# Patient Record
Sex: FEMALE | Race: WHITE | URBAN - METROPOLITAN AREA
[De-identification: names, ages, dates, MRNs, and addresses within clinical notes are randomized per-mention and may not be internally consistent; named-entity substitution may affect disease eponyms.]

---

## 2024-02-06 ENCOUNTER — ULTRASOUND ENCOUNTER (OUTPATIENT)
Facility: CLINIC | Age: 29
End: 2024-02-06
Payer: COMMERCIAL

## 2024-02-06 ENCOUNTER — INITIAL PRENATAL (OUTPATIENT)
Facility: CLINIC | Age: 29
End: 2024-02-06
Payer: COMMERCIAL

## 2024-02-06 VITALS
WEIGHT: 253.63 LBS | DIASTOLIC BLOOD PRESSURE: 62 MMHG | SYSTOLIC BLOOD PRESSURE: 112 MMHG | HEART RATE: 89 BPM | HEIGHT: 64 IN | BODY MASS INDEX: 43.3 KG/M2

## 2024-02-06 DIAGNOSIS — O99.210 SEVERE OBESITY DUE TO EXCESS CALORIES AFFECTING PREGNANCY, ANTEPARTUM (HCC): ICD-10-CM

## 2024-02-06 DIAGNOSIS — Z3A.01 LESS THAN 8 WEEKS GESTATION OF PREGNANCY (HCC): ICD-10-CM

## 2024-02-06 DIAGNOSIS — E66.01 SEVERE OBESITY DUE TO EXCESS CALORIES AFFECTING PREGNANCY, ANTEPARTUM (HCC): ICD-10-CM

## 2024-02-06 DIAGNOSIS — Z34.80 ENCOUNTER FOR SUPERVISION OF OTHER NORMAL PREGNANCY, UNSPECIFIED TRIMESTER (HCC): Primary | ICD-10-CM

## 2024-02-06 LAB
APPEARANCE: CLEAR
BILIRUBIN: NEGATIVE
CREAT UR-SCNC: 153 MG/DL
GLUCOSE (URINE DIPSTICK): NEGATIVE MG/DL
KETONES (URINE DIPSTICK): NEGATIVE MG/DL
LEUKOCYTES: NEGATIVE
MULTISTIX LOT#: NORMAL NUMERIC
NITRITE, URINE: NEGATIVE
OCCULT BLOOD: NEGATIVE
PH, URINE: 5.5 (ref 4.5–8)
PROT UR-MCNC: 6.4 MG/DL
PROT/CREAT UR-RTO: 0.04
PROTEIN (URINE DIPSTICK): NEGATIVE MG/DL
SPECIFIC GRAVITY: 1.03 (ref 1–1.03)
URINE-COLOR: YELLOW
UROBILINOGEN,SEMI-QN: 0.2 MG/DL (ref 0–1.9)

## 2024-02-06 PROCEDURE — 3078F DIAST BP <80 MM HG: CPT | Performed by: OBSTETRICS & GYNECOLOGY

## 2024-02-06 PROCEDURE — 3074F SYST BP LT 130 MM HG: CPT | Performed by: OBSTETRICS & GYNECOLOGY

## 2024-02-06 PROCEDURE — 82570 ASSAY OF URINE CREATININE: CPT | Performed by: OBSTETRICS & GYNECOLOGY

## 2024-02-06 PROCEDURE — 84156 ASSAY OF PROTEIN URINE: CPT | Performed by: OBSTETRICS & GYNECOLOGY

## 2024-02-06 PROCEDURE — 76801 OB US < 14 WKS SINGLE FETUS: CPT | Performed by: OBSTETRICS & GYNECOLOGY

## 2024-02-06 PROCEDURE — 3008F BODY MASS INDEX DOCD: CPT | Performed by: OBSTETRICS & GYNECOLOGY

## 2024-02-06 PROCEDURE — 87086 URINE CULTURE/COLONY COUNT: CPT | Performed by: OBSTETRICS & GYNECOLOGY

## 2024-02-06 PROCEDURE — 81002 URINALYSIS NONAUTO W/O SCOPE: CPT | Performed by: OBSTETRICS & GYNECOLOGY

## 2024-02-06 RX ORDER — PROGESTERONE 200 MG/1
200 CAPSULE ORAL NIGHTLY
COMMUNITY
Start: 2024-01-19 | End: 2024-02-15

## 2024-02-06 RX ORDER — CHOLECALCIFEROL (VITAMIN D3) 25 MCG
1 TABLET,CHEWABLE ORAL DAILY
COMMUNITY

## 2024-02-06 NOTE — PROGRESS NOTES
New OB    Patient had spotting couple weeks ago and was started on progesterone 200 BID oral, no bleeding for the past week  - denies h/o HSV, blood transfusion, hepatitis  - had carrier screen with last pregnancy done - negative per patient  - EDC by 7w1d , not c/w LMP,menses every 35-37 days.  - patient had pap smear , would like to postpone pap until after delivery  - NIPS next visit    Poor OB history    - fell at 24 weeks, started bleeding ( abruptio?) delivered at 26 weeks, baby  5d   SAB 6-7 weeks   had cerclage 16 weeks in [facility]. Patient said her cervical length was 3.5 at the time but suspected ?funneling, patient had PPROM several days later, developed chorio, delivered 18-19 weeks      Obesity  - discussed limit weight gain, diet modifications  - 1GTT, CMP, TSH, p/c ratio  - mentioned ASA to start after next visit  - L2U, 32 weeks growth, NST 36 wks      2. Incompetent cervix?  - cervical length   - cervical length 12-13 weeks

## 2024-02-07 RX ORDER — PROGESTERONE 200 MG/1
200 CAPSULE ORAL NIGHTLY
Refills: 0 | OUTPATIENT
Start: 2024-02-07

## 2024-02-08 ENCOUNTER — LAB ENCOUNTER (OUTPATIENT)
Dept: LAB | Facility: HOSPITAL | Age: 29
End: 2024-02-08
Attending: OBSTETRICS & GYNECOLOGY
Payer: COMMERCIAL

## 2024-02-08 DIAGNOSIS — Z34.80 ENCOUNTER FOR SUPERVISION OF OTHER NORMAL PREGNANCY, UNSPECIFIED TRIMESTER: ICD-10-CM

## 2024-02-08 DIAGNOSIS — E66.01 SEVERE OBESITY DUE TO EXCESS CALORIES AFFECTING PREGNANCY, ANTEPARTUM (HCC): ICD-10-CM

## 2024-02-08 DIAGNOSIS — O99.210 SEVERE OBESITY DUE TO EXCESS CALORIES AFFECTING PREGNANCY, ANTEPARTUM (HCC): ICD-10-CM

## 2024-02-08 LAB
ALBUMIN SERPL-MCNC: 3.6 G/DL (ref 3.4–5)
ALBUMIN/GLOB SERPL: 1 {RATIO} (ref 1–2)
ALP LIVER SERPL-CCNC: 75 U/L
ALT SERPL-CCNC: 23 U/L
ANION GAP SERPL CALC-SCNC: 5 MMOL/L (ref 0–18)
ANTIBODY SCREEN: NEGATIVE
AST SERPL-CCNC: 12 U/L (ref 15–37)
BASOPHILS # BLD AUTO: 0.07 X10(3) UL (ref 0–0.2)
BASOPHILS NFR BLD AUTO: 0.6 %
BILIRUB SERPL-MCNC: 0.2 MG/DL (ref 0.1–2)
BUN BLD-MCNC: 8 MG/DL (ref 9–23)
CALCIUM BLD-MCNC: 9 MG/DL (ref 8.5–10.1)
CHLORIDE SERPL-SCNC: 107 MMOL/L (ref 98–112)
CO2 SERPL-SCNC: 25 MMOL/L (ref 21–32)
CREAT BLD-MCNC: 0.93 MG/DL
EGFRCR SERPLBLD CKD-EPI 2021: 86 ML/MIN/1.73M2 (ref 60–?)
EOSINOPHIL # BLD AUTO: 0.06 X10(3) UL (ref 0–0.7)
EOSINOPHIL NFR BLD AUTO: 0.5 %
ERYTHROCYTE [DISTWIDTH] IN BLOOD BY AUTOMATED COUNT: 12.4 %
FASTING STATUS PATIENT QL REPORTED: NO
GLOBULIN PLAS-MCNC: 3.7 G/DL (ref 2.8–4.4)
GLUCOSE 1H P GLC SERPL-MCNC: 112 MG/DL
GLUCOSE BLD-MCNC: 110 MG/DL (ref 70–99)
HBV SURFACE AG SER-ACNC: <0.1 [IU]/L
HBV SURFACE AG SERPL QL IA: NONREACTIVE
HCT VFR BLD AUTO: 43.9 %
HCV AB SERPL QL IA: NONREACTIVE
HGB BLD-MCNC: 14.9 G/DL
IMM GRANULOCYTES # BLD AUTO: 0.09 X10(3) UL (ref 0–1)
IMM GRANULOCYTES NFR BLD: 0.7 %
LYMPHOCYTES # BLD AUTO: 2.63 X10(3) UL (ref 1–4)
LYMPHOCYTES NFR BLD AUTO: 21.8 %
MCH RBC QN AUTO: 31 PG (ref 26–34)
MCHC RBC AUTO-ENTMCNC: 33.9 G/DL (ref 31–37)
MCV RBC AUTO: 91.3 FL
MONOCYTES # BLD AUTO: 0.68 X10(3) UL (ref 0.1–1)
MONOCYTES NFR BLD AUTO: 5.6 %
NEUTROPHILS # BLD AUTO: 8.51 X10 (3) UL (ref 1.5–7.7)
NEUTROPHILS # BLD AUTO: 8.51 X10(3) UL (ref 1.5–7.7)
NEUTROPHILS NFR BLD AUTO: 70.8 %
OSMOLALITY SERPL CALC.SUM OF ELEC: 283 MOSM/KG (ref 275–295)
PLATELET # BLD AUTO: 302 10(3)UL (ref 150–450)
POTASSIUM SERPL-SCNC: 3.6 MMOL/L (ref 3.5–5.1)
PROT SERPL-MCNC: 7.3 G/DL (ref 6.4–8.2)
RBC # BLD AUTO: 4.81 X10(6)UL
RH BLOOD TYPE: POSITIVE
RUBV IGG SER QL: POSITIVE
RUBV IGG SER-ACNC: 80.7 IU/ML (ref 10–?)
SODIUM SERPL-SCNC: 137 MMOL/L (ref 136–145)
T PALLIDUM AB SER QL IA: NONREACTIVE
TSI SER-ACNC: 1.47 MIU/ML (ref 0.36–3.74)
WBC # BLD AUTO: 12 X10(3) UL (ref 4–11)

## 2024-02-08 PROCEDURE — 82950 GLUCOSE TEST: CPT

## 2024-02-08 PROCEDURE — 86850 RBC ANTIBODY SCREEN: CPT

## 2024-02-08 PROCEDURE — 80053 COMPREHEN METABOLIC PANEL: CPT

## 2024-02-08 PROCEDURE — 86900 BLOOD TYPING SEROLOGIC ABO: CPT

## 2024-02-08 PROCEDURE — 86803 HEPATITIS C AB TEST: CPT

## 2024-02-08 PROCEDURE — 85025 COMPLETE CBC W/AUTO DIFF WBC: CPT

## 2024-02-08 PROCEDURE — 86762 RUBELLA ANTIBODY: CPT

## 2024-02-08 PROCEDURE — 87389 HIV-1 AG W/HIV-1&-2 AB AG IA: CPT

## 2024-02-08 PROCEDURE — 84443 ASSAY THYROID STIM HORMONE: CPT

## 2024-02-08 PROCEDURE — 86780 TREPONEMA PALLIDUM: CPT

## 2024-02-08 PROCEDURE — 87340 HEPATITIS B SURFACE AG IA: CPT

## 2024-02-08 PROCEDURE — 36415 COLL VENOUS BLD VENIPUNCTURE: CPT

## 2024-02-08 PROCEDURE — 86901 BLOOD TYPING SEROLOGIC RH(D): CPT

## 2024-02-12 RX ORDER — PROGESTERONE 200 MG/1
200 CAPSULE ORAL NIGHTLY
Refills: 0 | OUTPATIENT
Start: 2024-02-12

## 2024-02-13 NOTE — TELEPHONE ENCOUNTER
Patient has called the office requesting refill, refill has been denied. She has appointment coming up March 8.

## 2024-02-15 RX ORDER — PROGESTERONE 200 MG/1
200 CAPSULE ORAL NIGHTLY
Qty: 30 CAPSULE | Refills: 0 | Status: SHIPPED | OUTPATIENT
Start: 2024-02-15

## 2024-02-16 ENCOUNTER — TELEPHONE (OUTPATIENT)
Facility: CLINIC | Age: 29
End: 2024-02-16

## 2024-02-16 NOTE — TELEPHONE ENCOUNTER
Singapore[place]  #[id number]    Spoke with pt. 8 weeks 4 days had FOB- 2/6/24. C/O small amount dark brown discharge. Nothing on her pad or her in her underwear. Noticed on toilet paper when wipes. Denies cramping. Has some mild low back pain. Denies any urinary symptoms. Pt is requesting vaginal progesterone. I explained vaginal progesterone is not need at this time. Last progesterone level was 16.00. Offered an appointment tomorrow. Would prefer not to come in. She lives 1 hour away. Advised to continue to monitor. To call with any increased discharge, bright red bleeding, or abdominal pain. Will route to on call provider and call if any other recommendations. Verbalized understanding.

## 2024-03-08 ENCOUNTER — ROUTINE PRENATAL (OUTPATIENT)
Facility: CLINIC | Age: 29
End: 2024-03-08
Payer: COMMERCIAL

## 2024-03-08 ENCOUNTER — TELEPHONE (OUTPATIENT)
Facility: CLINIC | Age: 29
End: 2024-03-08

## 2024-03-08 ENCOUNTER — ULTRASOUND ENCOUNTER (OUTPATIENT)
Facility: CLINIC | Age: 29
End: 2024-03-08
Payer: COMMERCIAL

## 2024-03-08 VITALS
SYSTOLIC BLOOD PRESSURE: 104 MMHG | HEIGHT: 64 IN | BODY MASS INDEX: 44.22 KG/M2 | WEIGHT: 259 LBS | DIASTOLIC BLOOD PRESSURE: 62 MMHG | HEART RATE: 120 BPM

## 2024-03-08 DIAGNOSIS — Z34.81 PRENATAL CARE, SUBSEQUENT PREGNANCY IN FIRST TRIMESTER (HCC): Primary | ICD-10-CM

## 2024-03-08 DIAGNOSIS — O26.851 SPOTTING AFFECTING PREGNANCY IN FIRST TRIMESTER (HCC): ICD-10-CM

## 2024-03-08 DIAGNOSIS — Z3A.11 11 WEEKS GESTATION OF PREGNANCY (HCC): ICD-10-CM

## 2024-03-08 PROCEDURE — 3008F BODY MASS INDEX DOCD: CPT | Performed by: OBSTETRICS & GYNECOLOGY

## 2024-03-08 PROCEDURE — 76817 TRANSVAGINAL US OBSTETRIC: CPT | Performed by: OBSTETRICS & GYNECOLOGY

## 2024-03-08 PROCEDURE — 3074F SYST BP LT 130 MM HG: CPT | Performed by: OBSTETRICS & GYNECOLOGY

## 2024-03-08 PROCEDURE — 3078F DIAST BP <80 MM HG: CPT | Performed by: OBSTETRICS & GYNECOLOGY

## 2024-03-08 PROCEDURE — 81514 NFCT DS BV&VAGINITIS DNA ALG: CPT | Performed by: OBSTETRICS & GYNECOLOGY

## 2024-03-08 NOTE — PROGRESS NOTES
Patient noticed one episode of pink discharge 2 days ago    SVE: no bleeding, vag culture done    - had carrier screen with last pregnancy done - negative per patient  - EDC by 7w1d , not c/w LMP,menses every 35-37 days.    - NIPS today     Poor OB history    - fell at 24 weeks, started bleeding ( abruptio?) delivered at 26 weeks, baby  5d   SAB 6-7 weeks   had cerclage 16 weeks in [facility]. Patient said her cervical length was 3.5 at the time but suspected ?funneling, patient had PPROM several days later, developed chorio, delivered 18-19 weeks        Obesity  - discussed limit weight gain, diet modifications  - 1GTT, CMP, TSH, p/c ratio normal  -  ASA 1.5 daily will start  - L2U, 32 weeks growth, NST 36 wks        2. Incompetent cervix?  - cervical length   - cervical length 11w4d - 3.5 cm vaginal

## 2024-03-08 NOTE — TELEPHONE ENCOUNTER
11   Ivey pregnancy    Juliette NIPS lab draw per Dr. Hodge     Tubes name/ labeled verified with patient  Specimen placed in  to order  Discussed to call office in 1 weeks for results, gender information will be sent in her email.   Patient verbalized understanding, agreed to and intend to comply with plan of care.

## 2024-03-09 LAB
BV BACTERIA DNA VAG QL NAA+PROBE: NEGATIVE
C GLABRATA DNA VAG QL NAA+PROBE: NEGATIVE
C KRUSEI DNA VAG QL NAA+PROBE: NEGATIVE
CANDIDA DNA VAG QL NAA+PROBE: NEGATIVE
T VAGINALIS DNA VAG QL NAA+PROBE: NEGATIVE

## 2024-03-17 ENCOUNTER — TELEPHONE (OUTPATIENT)
Facility: CLINIC | Age: 29
End: 2024-03-17

## 2024-03-19 ENCOUNTER — LAB ENCOUNTER (OUTPATIENT)
Dept: LAB | Facility: HOSPITAL | Age: 29
End: 2024-03-19
Attending: OBSTETRICS & GYNECOLOGY
Payer: COMMERCIAL

## 2024-03-19 ENCOUNTER — ULTRASOUND ENCOUNTER (OUTPATIENT)
Facility: CLINIC | Age: 29
End: 2024-03-19
Payer: COMMERCIAL

## 2024-03-19 ENCOUNTER — OFFICE VISIT (OUTPATIENT)
Facility: CLINIC | Age: 29
End: 2024-03-19
Payer: COMMERCIAL

## 2024-03-19 ENCOUNTER — TELEPHONE (OUTPATIENT)
Facility: CLINIC | Age: 29
End: 2024-03-19

## 2024-03-19 VITALS
DIASTOLIC BLOOD PRESSURE: 64 MMHG | HEIGHT: 64 IN | WEIGHT: 258.19 LBS | BODY MASS INDEX: 44.08 KG/M2 | HEART RATE: 101 BPM | SYSTOLIC BLOOD PRESSURE: 110 MMHG

## 2024-03-19 DIAGNOSIS — O46.90 VAGINAL BLEEDING IN PREGNANCY (HCC): ICD-10-CM

## 2024-03-19 DIAGNOSIS — O26.20 RECURRENT PREGNANCY LOSS, ANTEPARTUM CONDITION OR COMPLICATION (HCC): ICD-10-CM

## 2024-03-19 DIAGNOSIS — Z3A.13 13 WEEKS GESTATION OF PREGNANCY (HCC): ICD-10-CM

## 2024-03-19 DIAGNOSIS — Z34.81 PRENATAL CARE, SUBSEQUENT PREGNANCY IN FIRST TRIMESTER (HCC): ICD-10-CM

## 2024-03-19 DIAGNOSIS — O26.20 RECURRENT PREGNANCY LOSS, ANTEPARTUM CONDITION OR COMPLICATION (HCC): Primary | ICD-10-CM

## 2024-03-19 PROBLEM — O99.210 OBESITY AFFECTING PREGNANCY, ANTEPARTUM (HCC): Status: ACTIVE | Noted: 2024-03-19

## 2024-03-19 PROCEDURE — 3008F BODY MASS INDEX DOCD: CPT | Performed by: OBSTETRICS & GYNECOLOGY

## 2024-03-19 PROCEDURE — 3078F DIAST BP <80 MM HG: CPT | Performed by: OBSTETRICS & GYNECOLOGY

## 2024-03-19 PROCEDURE — 76817 TRANSVAGINAL US OBSTETRIC: CPT | Performed by: OBSTETRICS & GYNECOLOGY

## 2024-03-19 PROCEDURE — 86147 CARDIOLIPIN ANTIBODY EA IG: CPT

## 2024-03-19 PROCEDURE — 99214 OFFICE O/P EST MOD 30 MIN: CPT | Performed by: OBSTETRICS & GYNECOLOGY

## 2024-03-19 PROCEDURE — 86146 BETA-2 GLYCOPROTEIN ANTIBODY: CPT

## 2024-03-19 PROCEDURE — 3074F SYST BP LT 130 MM HG: CPT | Performed by: OBSTETRICS & GYNECOLOGY

## 2024-03-19 PROCEDURE — 85610 PROTHROMBIN TIME: CPT

## 2024-03-19 PROCEDURE — 36415 COLL VENOUS BLD VENIPUNCTURE: CPT

## 2024-03-19 RX ORDER — PROGESTERONE 200 MG/1
200 CAPSULE ORAL NIGHTLY
Qty: 30 CAPSULE | Refills: 0 | Status: SHIPPED | OUTPATIENT
Start: 2024-03-19

## 2024-03-19 NOTE — PROGRESS NOTES
Maral Young is a 28 year old female  Patient's last menstrual period was 12/10/2023 (exact date).   Chief Complaint   Patient presents with    Prenatal Care     13 weeks   Last night had some heavy bleeding (clots)   Discuss US from today    .  Patient is 13w1d pregnant started heavy bleeding with clots last night, she felt like she lost a lot of blood, showed picture with clots. The bleeding eventually slowed down, spotting today  OBSTETRICS HISTORY:  OB History    Para Term  AB Living   4 2   2 1     SAB IAB Ectopic Multiple Live Births   1              # Outcome Date GA Lbr Wilbert/2nd Weight Sex Delivery Anes PTL Lv   4 Current            3  22 19w0d    Vag-Forceps   FD      Complications: Chorioamnionitis, Other - see comments   2 SAB  6w0d          1  14 26w0d   M Vag-Spont   FD      Complications: Other - see comments       GYNE HISTORY:  Periods  -pregnant     History   Sexual Activity    Sexual activity: Not on file                 MEDICAL HISTORY:  No past medical history on file.    SURGICAL HISTORY:  Past Surgical History:   Procedure Laterality Date    Appendectomy         SOCIAL HISTORY:  Social History     Socioeconomic History    Marital status:      Spouse name: Not on file    Number of children: Not on file    Years of education: Not on file    Highest education level: Not on file   Occupational History    Not on file   Tobacco Use    Smoking status: Never    Smokeless tobacco: Never   Substance and Sexual Activity    Alcohol use: Not Currently    Drug use: Never    Sexual activity: Not on file   Other Topics Concern    Not on file   Social History Narrative    Not on file     Social Determinants of Health     Financial Resource Strain: Not on file   Food Insecurity: Not on file   Transportation Needs: Not on file   Stress: Not on file   Housing Stability: Not on file       FAMILY HISTORY:  No family history on  file.    MEDICATIONS:    Current Outpatient Medications:     ASPIRIN OR, Take by mouth., Disp: , Rfl:     progesterone 200 MG Oral Cap, Take 1 capsule (200 mg total) by mouth nightly. TAKE AT BEDTIME, Disp: 30 capsule, Rfl: 0    prenatal vitamin with DHA 27-0.8-228 MG Oral Cap, Take 1 capsule by mouth daily., Disp: , Rfl:     ALLERGIES:  No Known Allergies      PHYSICAL EXAM:   Pelvic Exam:  External Genitalia: normal appearance, hair distribution, and no lesions  Urethral Meatus:  normal in size, location, without lesions and prolapse  Bladder:  No fullness, masses or tenderness  Vagina:  Normal appearance without lesions, no abnormal discharge  Cervix:  external os 1 cm dilated  Uterus: 13 weeks in size,  Adnexa: normal without masses or tenderness  Perineum: normal    USTRANSABDOMINAL AND TRANSVAGINAL ULTRASOUNDS HAVE BEEN PERFORMED  SINGLE VIABLE IUP SEEN  LMP= 12-=14W2D  CRL = 6.92CM=13W1D  FHT =158 BPM  CERVICAL LENGTH TRANSVAGINALLY =3.58CM. DYNAMIC CERVIX.  DILATION CLOSE TO IS IS 0.5CM  POSTERIOR PLACENTA . NO PREVIA. TIP SEEN 2.7CM AWAY FROM IO.    Discussed unlikely incompetent cervix, possible abruption? Discussed with MFM - they will see patient tomorrow.   Patient would like to continue Prometrium 200 nightly       Assessment & Plan:  1. Recurrent pregnancy loss, antepartum condition or complication (HCC)    - Antiphospholipid Syndrome (APS) Profile; Future  - US OB Transvaginal (any trimester) [97440]; Future  - US OB Transvaginal (any trimester) [92654]    2. Vaginal bleeding in pregnancy (HCC)    - Antiphospholipid Syndrome (APS) Profile; Future  - US OB Transvaginal (any trimester) [97046]; Future  - US OB Transvaginal (any trimester) [23782]    3. Prenatal care, subsequent pregnancy in first trimester ([facility])      4. 13 weeks gestation of pregnancy ([facility])

## 2024-03-19 NOTE — TELEPHONE ENCOUNTER
Received call from [facility] requesting for an order so they can call pt. Will route to provider. Verbalized understanding.

## 2024-03-19 NOTE — TELEPHONE ENCOUNTER
Pregnant pt is bleeding since 12 midnight. She passed 2 large clots. O cramping, no fever. This is her 5th pregnancy. She has had 4 miscarriages. Please advise . She is here with an . Thanks

## 2024-03-20 ENCOUNTER — OFFICE VISIT (OUTPATIENT)
Dept: PERINATAL CARE | Facility: HOSPITAL | Age: 29
End: 2024-03-20
Attending: OBSTETRICS & GYNECOLOGY
Payer: COMMERCIAL

## 2024-03-20 VITALS
HEART RATE: 98 BPM | WEIGHT: 257 LBS | HEIGHT: 64 IN | SYSTOLIC BLOOD PRESSURE: 113 MMHG | DIASTOLIC BLOOD PRESSURE: 68 MMHG | BODY MASS INDEX: 43.87 KG/M2

## 2024-03-20 DIAGNOSIS — O26.20 RECURRENT PREGNANCY LOSS, ANTEPARTUM CONDITION OR COMPLICATION (HCC): ICD-10-CM

## 2024-03-20 DIAGNOSIS — O46.90 VAGINAL BLEEDING IN PREGNANCY (HCC): ICD-10-CM

## 2024-03-20 DIAGNOSIS — O20.9 VAGINAL BLEEDING IN PREGNANCY, FIRST TRIMESTER (HCC): Primary | ICD-10-CM

## 2024-03-20 DIAGNOSIS — O99.213 MATERNAL MORBID OBESITY IN THIRD TRIMESTER, ANTEPARTUM (HCC): ICD-10-CM

## 2024-03-20 DIAGNOSIS — O20.9 FIRST TRIMESTER BLEEDING (HCC): ICD-10-CM

## 2024-03-20 DIAGNOSIS — E66.01 MATERNAL MORBID OBESITY IN THIRD TRIMESTER, ANTEPARTUM (HCC): ICD-10-CM

## 2024-03-20 DIAGNOSIS — O20.9 FIRST TRIMESTER BLEEDING (HCC): Primary | ICD-10-CM

## 2024-03-20 PROCEDURE — 76813 OB US NUCHAL MEAS 1 GEST: CPT | Performed by: OBSTETRICS & GYNECOLOGY

## 2024-03-20 NOTE — PROGRESS NOTES
Outpatient Maternal-Fetal Medicine Consultation    Dear Dr. Hodge    Thank you for requesting ultrasound evaluation and maternal fetal medicine consultation on your patient Maral Young.  As you are aware she is a 28 year old female  with a baker pregnancy and an Estimated Date of Delivery: 24.  A maternal-fetal medicine consultation was requested secondary to Obesity, morbid.  Her prenatal records and labs were reviewed.    [place]  ID number [id number] was used today.    In , she fell onto a hard pavement at 24 weeks.  She then had bleeding 5 hours later.  Then she was found to have her cervix fully deep dilated and had an abruption.  The baby suffered a  death at 5 days of age.    In  she had a miscarriage at 6 to 7 weeks.    [place]  [place] and moved back to the [facility] for the pregnancy.  Had a cerclage placed at 16 weeks on 2022..  She was told she had a previa.  On  that the war started.  On 2022 she went to [place] to travel to the  and came here.  On  [place]  She was determined to have premature rupture membranes and chorioamnionitis at that time.  She did not have vaginal bleeding prior to the short cervix being found.    She has irregular cycles and is dated by the ultrasound done yesterday with an ALYSSA of 2024.    During this pregnancy she has had spotting throughout.  She had spotting during the fifth week and went to the [facility] and had an ultrasound.  She then had bleeding again and had an ultrasound on the seventh week pulm size clot.  She states she lost about 1 L of blood.  She has been having severe bleeding for the last 1 to 2 days.        ROS    HISTORY  OB History    Para Term  AB Living   4 2   2 1     SAB IAB Ectopic Multiple Live Births   1              # Outcome Date GA Lbr Wilbert/2nd Weight Sex Delivery Anes PTL Lv   4 Current            3   22 19w0d    Vag-Forceps   FD      Complications: Chorioamnionitis, Other - see comments   2 SAB  6w0d          1  14 26w0d   M Vag-Spont   FD      Complications: Other - see comments       Allergies:  No Known Allergies   Current Meds:  Current Outpatient Medications   Medication Sig Dispense Refill    ASPIRIN OR Take 81 mg by mouth.      progesterone 200 MG Oral Cap Take 1 capsule (200 mg total) by mouth nightly. TAKE AT BEDTIME 30 capsule 0    prenatal vitamin with DHA 27-0.8-228 MG Oral Cap Take 1 capsule by mouth daily.          HISTORY:  No past medical history on file.   Past Surgical History:   Procedure Laterality Date    APPENDECTOMY        No family history on file.   Social History     Socioeconomic History    Marital status:    Tobacco Use    Smoking status: Never    Smokeless tobacco: Never   Substance and Sexual Activity    Alcohol use: Not Currently    Drug use: Never          PHYSICAL EXAMINATION:  /68 (BP Location: Right arm, Patient Position: Sitting, Cuff Size: large)   Pulse 98   Ht 5' 4\" (1.626 m)   Wt 257 lb (116.6 kg)   LMP 12/10/2023 (Exact Date)   BMI 44.11 kg/m²   Physical Exam  Constitutional:       Appearance: Normal appearance.   Abdominal:      Palpations: Abdomen is soft.      Tenderness: There is no abdominal tenderness.   Neurological:      Mental Status: She is alert.   Psychiatric:         Mood and Affect: Mood normal.         Behavior: Behavior normal.           OBSTETRIC ULTRASOUND  The patient had a first trimester ultrasound today which revealed normal first trimester anatomy with size consistent with dates.  The NT measurement was: 1.61 mm; this is within normal limits.  The nasal bone is present.  Single IUP with cardiac activity 146 bpm  Amniotic fluid is normal.  Placenta location is posterior  The CRL is consistent with gestational age. Nasal bone present. The nuchal translucency measures 1.61 mm. This is within normal  limits.   The maternal uterus and ovaries appear normal.  See PACS/Imaging Tab For Complete Ultrasound Report  I interpreted the results and reviewed them with the patient.    DISCUSSION  During her visit we discussed and reviewed the following issues:  Cerclage-                              A history indicated cerclage can be placed at 12 weeks.  This has been shown to reduce the  birth rate less than 33 weeks  from 17% to 13%.  The advantage of this is the cerclage is placed prior to cervical changes.  In addition, if there is a loss or previable PROM, removal of the pregnancy from the uterus is easier and less risky to the patient.  Induction, D&C, and D&E are all options in this scenario.  Each pregnancy, the cerclage is removed at 36-37 weeks or at the time of labor.  The cerclage is then placed again in the subsequent pregnancy.                         We also discussed serial cervical lengths starting at 16 until 22 weeks.  50% of women will not need a cerclage.  The 50% that do are offered to have an ultrasound indicated cerclage.  Often the cervix is already open in this cohort of women.                 In  she had a fall that then she had an abruption soon after.  A cerclage would not stop this outcome.  In addition it would clear to recur since it was related to a fall.    In  she was found to have a short cervix early in the pregnancy at 16 weeks and had a cerclage placed.  However in the weeks after there was significant stress with the were starting in the [facility] and her moving back to the  quickly.  In general when a cerclage was placed at 16 weeks for a short cervix, we would not recommend traveling.  It is possible that the stress and the amount of things that were required for her to do at that time may have played into the loss of the pregnancy.  She did not cause her pregnancy loss in the premature breaking of the water.  She did what she needed to do during that time and moved  back to the [place].  It is also possible that it is due to cervical incompetence.  Currently she had active bleeding in the last 1-2 use that was significant.  A relative contraindication to cerclage is vaginal bleeding.  Therefore it is too early at this time to place a cerclage in the setting of bleeding.  I would like to see if she has a few more weeks without further complications in the pregnancy.  Vaginal bleeding can be showing that there may be a complication in the pregnancy that a cerclage would not stop.  She has a normal NIPT screen that is low risk for aneuploidy and 22 every 1.2 deletion.  We also discussed that there can be other chromosomal abnormalities that can lead to miscarriage.    At this time she asked if there is anything that she could do.  It is so important for her to have this pregnancy.  I stated that we could start the vaginal progesterone at 14 weeks.  I went over that the vaginal progesterone will not stop vaginal bleeding.  There is not a medication that we will correct that.  Will do an early cervical length in 2 weeks and reassess how much bleeding she has.  At that time a decision can be made whether to continue the vaginal progesterone and do cervical length every 2 weeks or if a cerclage might be considered at that time..     After patient left, I noted that she was prescribed oral progesterone which is probably better at this point due to the recent vaginal bleeding.    Ultrasound was scheduled for 2 weeks for cervical length and a anatomy scan was scheduled for 20 weeks.        GLUCOSE 1HR OB   Date Value Ref Range Status   02/08/2024 112 See comment mg/dL Final     Comment:     If the plasma glucose level measured after 1 hour is >=130, 135 or 140 mg/dl proceed to \"Glucose Tolerance, 100 gm (0 hr, 1 hr, 2hr, 3hr), Gestational (ADA)\" test on a separate day, as clinically indicated.          Class III obesity and second and third trimester monitoring were not discussed  today due to the importance of discussing the vaginal bleeding and her history of losses.    IMPRESSION:  IUP at 13w2d  Class III obesity  History of second trimester abruption after fall with  death  History of short cervix with failed cerclage.  Threatened     RECOMMENDATIONS:  Continue care with Dr. Hodge  Cervical length in 2 weeks  Early 1 hr gtt  Level II ultrasound 20 weeks  Monthly growth ultrasounds starting at 28 weeks, add BPP to each growth ultrasound at 32 weeks and beyond  Weekly NSTs at 34 weeks  Limit weight gain to 11-20 pounds.  Delivery at 39-40 weeks   Continue progesterone by mouth prescribed by Dr. Real.  If vaginal bleeding subsides, then may consider vaginal progesterone after 14 weeks.      Thank you for allowing me to participate in the care of your patient.  Please do not hesitate to contact me if additional questions or concerns arise.      Marley Up M.D.    80 minutes spent in review of records, patient consultation, documentation and coordination of care.  The relevant clinical matter(s) are summarized above.     Note to patient and family  The 21st Century Cures Act makes medical notes available to patients in the interest of transparency.  However, please be advised that this is a medical document.  It is intended as tbry-it-gded communication.  It is written and medical language may contain abbreviations or verbiage that are technical and unfamiliar.  It may appear blunt or direct.  Medical documents are intended to carry relevant information, facts as evident, and the clinical opinion of the practitioner.

## 2024-03-20 NOTE — PROGRESS NOTES
# [id number]  Pt here for 1st trimester ultrasound/doctor consult  Pt denies bleeding today, denies uterine cramping, denies leaking fluid.

## 2024-03-21 NOTE — PROGRESS NOTES
Outpatient Maternal-Fetal Medicine Follow-Up     Dear Dr. Hodge     Thank you for requesting ultrasound evaluation and maternal fetal medicine consultation on your patient Maral Young.  As you are aware she is a 28 year old female  with a baker pregnancy and an Estimated Date of Delivery: 24.  A maternal-fetal medicine consultation was requested secondary to vaginal bleeding, poor OB history Her prenatal records and labs were reviewed.     ROS     HISTORY                   OB History    Para Term  AB Living   4 2   2 1     SAB IAB Ectopic Multiple Live Births      1                  # Outcome Date GA Lbr Wilbert/2nd Weight Sex Delivery Anes PTL Lv   4 Current                     3  22 19w0d       Vag-Forceps     FD      Complications: Chorioamnionitis, Other - see comments   2 SAB 2018 6w0d                 1  14 26w0d     M Vag-Spont     FD      Complications: Other - see comments         Allergies:  No Known Allergies   Current Meds:         Current Outpatient Medications   Medication Sig Dispense Refill    ASPIRIN OR Take 81 mg by mouth.        progesterone 200 MG Oral Cap Take 1 capsule (200 mg total) by mouth nightly. TAKE AT BEDTIME 30 capsule 0    prenatal vitamin with DHA 27-0.8-228 MG Oral Cap Take 1 capsule by mouth daily.             HISTORY:  No past medical history on file.         Past Surgical History:   Procedure Laterality Date    APPENDECTOMY         No family history on file.   Social History           Socioeconomic History    Marital status:    Tobacco Use    Smoking status: Never    Smokeless tobacco: Never   Substance and Sexual Activity    Alcohol use: Not Currently    Drug use: Never            PHYSICAL EXAMINATION:  /73 (BP Location: Right arm, Patient Position: Sitting, Cuff Size: large)   Pulse 92   Ht 5' 4\" (1.626 m)   Wt 254 lb (115.2 kg)   LMP 12/10/2023 (Exact Date)   BMI 43.60 kg/m²   Physical  Exam  Constitutional:       Appearance: Normal appearance.   Abdominal:      Palpations: Abdomen is soft.      Tenderness: There is no abdominal tenderness.   Neurological:      Mental Status: She is alert.   Psychiatric:         Mood and Affect: Mood normal.         Behavior: Behavior normal.           OBSTETRIC ULTRASOUND    Ultrasound Findings:    Ultrasound Findings:  Single IUP in cephalic presentation.    Placenta is posterior.   A 3 vessel cord is noted.  Cardiac activity is present at 149 bpm  MVP is 0 cm  The cervix was not able to be clearly visualized and appeared short by transabdominal ultrasound, therefore transvaginal ultrasound was performed. Transvaginal US findings: Cervix is dilated    See PACS/Imaging Tab For Complete Ultrasound Report  I interpreted the results and reviewed them with the patient.     DISCUSSION  During her visit we discussed and reviewed the following issues:  CERCLAGE            Dr. Up also discussed serial cervical lengths starting at 16 until 22 weeks.  50% of women will not need a cerclage.  The 50% that do are offered to have an ultrasound indicated cerclage.  Often the cervix is already open in this cohort of women.    POOR OB HISTORY  In , she fell onto a hard pavement at 24 weeks.  She then had bleeding 5 hours later.  Then she was found to have her cervix fully deep dilated and had an abruption.  The baby suffered a  death at 5 days of age.     In  she had a miscarriage at 6 to 7 weeks.     [place]  [place] and moved back to the [facility] for the pregnancy.  Had a cerclage placed at 16 weeks on 2022..  She was told she had a previa.  On  that the war started.  On 2022 she went to [place] to travel to the  and came here.  On  [place]  She was determined to have premature rupture membranes and chorioamnionitis at that time.  She did not have vaginal bleeding prior to the short  cervix being found.     She has irregular cycles and is dated by the ultrasound done with an ALYSSA of 2024.     During this pregnancy she has had spotting throughout.  She had spotting during the fifth week and  and had an ultrasound.  She then had bleeding again and had an ultrasound on the seventh week pulm size clot.  She states she lost about 1 L of blood.  She has been having severe bleeding for the last 1 to 2 days.          Dr. Up advised starting  vaginal progesterone at 14 weeks; she reviewed that it will not stop vaginal bleeding and there are NO medications that we will correct that.      An early cervical length in 2 weeks was advised for which she returns today.    Unfortunately the patient has advanced cervical dilatation (3 cm) with a bulging bag of water.  In addition anhydramnios is present.  It is unclear whether the patient might of had ruptured membranes or whether she has had a chronic placental abruption with subsequent decreased amniotic fluid volume.  Regardless, with advanced cervical dilatation and a bulging bag of water with anhydramnios a cerclage is CONTRAINDICATED.    OBESITY  This patient has obesity; her pre-gravid BMI is: 42  See prior [facility] notes for a detailed review.    IMPRESSION:  IUP at 15w3d  Class III obesity  History of second trimester abruption after fall with  death  History of short cervix with failed cerclage.  Inevitable  -      RECOMMENDATIONS:  Continue care with Dr. Hodge  To labor and delivery for further evaluation and consideration of augmentation of labor given inevitable     Ever Cortez M.D.  Maternal-Fetal Medicine    40 minutes spent in review of records, patient consultation, documentation and coordination of care.  The relevant clinical matter(s) are summarized above.

## 2024-03-22 PROBLEM — E66.01 MATERNAL MORBID OBESITY IN THIRD TRIMESTER, ANTEPARTUM (HCC): Status: ACTIVE | Noted: 2024-03-22

## 2024-03-22 PROBLEM — O99.213 MATERNAL MORBID OBESITY IN THIRD TRIMESTER, ANTEPARTUM (HCC): Status: ACTIVE | Noted: 2024-03-22

## 2024-03-22 LAB
APTT: 28.2 SEC
B2 GLYCOPROT I IGG AB: <9 GPI IGG UNITS
B2 GLYCOPROT I IGM AB: <9 GPI IGM UNITS
CARDIOLIPIN IGG: <9 GPL U/ML
CARDIOLIPIN IGM: <9 MPL U/ML
DRVVT: 40.2 SEC
HEXAGONAL PHASE PHOSPHOLIPID: 5 SEC
INR: 1
PT: 10.4 SEC
THROMBIN TIME: 16.6 SEC

## 2024-04-04 ENCOUNTER — TELEPHONE (OUTPATIENT)
Dept: PERINATAL CARE | Facility: HOSPITAL | Age: 29
End: 2024-04-04

## 2024-04-04 ENCOUNTER — OFFICE VISIT (OUTPATIENT)
Dept: PERINATAL CARE | Facility: HOSPITAL | Age: 29
End: 2024-04-04
Attending: OBSTETRICS & GYNECOLOGY
Payer: COMMERCIAL

## 2024-04-04 ENCOUNTER — ROUTINE PRENATAL (OUTPATIENT)
Facility: CLINIC | Age: 29
End: 2024-04-04
Payer: COMMERCIAL

## 2024-04-04 VITALS
BODY MASS INDEX: 43.47 KG/M2 | HEIGHT: 64 IN | HEART RATE: 117 BPM | SYSTOLIC BLOOD PRESSURE: 118 MMHG | WEIGHT: 254.63 LBS | DIASTOLIC BLOOD PRESSURE: 72 MMHG

## 2024-04-04 VITALS
HEART RATE: 92 BPM | SYSTOLIC BLOOD PRESSURE: 109 MMHG | WEIGHT: 254 LBS | BODY MASS INDEX: 43.36 KG/M2 | HEIGHT: 64 IN | DIASTOLIC BLOOD PRESSURE: 73 MMHG

## 2024-04-04 DIAGNOSIS — Z3A.15 15 WEEKS GESTATION OF PREGNANCY (HCC): ICD-10-CM

## 2024-04-04 DIAGNOSIS — Z34.82 PRENATAL CARE, SUBSEQUENT PREGNANCY IN SECOND TRIMESTER (HCC): Primary | ICD-10-CM

## 2024-04-04 DIAGNOSIS — O99.210 MATERNAL MORBID OBESITY, ANTEPARTUM (HCC): Primary | ICD-10-CM

## 2024-04-04 DIAGNOSIS — O03.4: ICD-10-CM

## 2024-04-04 DIAGNOSIS — E66.01 MATERNAL MORBID OBESITY, ANTEPARTUM (HCC): Primary | ICD-10-CM

## 2024-04-04 DIAGNOSIS — O20.9 VAGINAL BLEEDING AFFECTING EARLY PREGNANCY (HCC): ICD-10-CM

## 2024-04-04 DIAGNOSIS — O99.210 MATERNAL MORBID OBESITY, ANTEPARTUM (HCC): ICD-10-CM

## 2024-04-04 DIAGNOSIS — E66.01 MATERNAL MORBID OBESITY, ANTEPARTUM (HCC): ICD-10-CM

## 2024-04-04 PROCEDURE — 76817 TRANSVAGINAL US OBSTETRIC: CPT | Performed by: OBSTETRICS & GYNECOLOGY

## 2024-04-04 PROCEDURE — 76815 OB US LIMITED FETUS(S): CPT

## 2024-04-04 RX ORDER — CEPHALEXIN 500 MG/1
CAPSULE ORAL
COMMUNITY
Start: 2024-04-01 | End: 2024-04-06

## 2024-04-04 NOTE — PROGRESS NOTES
Pt spoke to OB provider via phone, pt instructed to return to Labor & Delivery , 4/5/24 @ 0800. Pt accompanied by spouse. Pt left M office in stable condition.

## 2024-04-04 NOTE — PROGRESS NOTES
Patient went to ED 24 with severe cramping , was diagnosed with UTI and started on abx, feeling better  ,- continues having light bleeding daily  - had MFM consultation 3/20/24 after an episode of very heavy bleeding, APL testing negative, has follow up today  - suspecting abruptio  -SVE: cx about 3 cm dilated     - had carrier screen with last pregnancy done - negative per patient  - EDC by 7w1d , not c/w LMP,menses every 35-37 days.    - NIPS neg Girl     Poor OB history    - fell at 24 weeks, started bleeding ( abruptio?) delivered at 26 weeks, baby  5d   SAB 6-7 weeks   had cerclage 16 weeks in [facility]. Patient said her cervical length was 3.5 at the time but suspected ?funneling, patient had PPROM several days later, developed chorio, delivered 18-19 weeks        Obesity  - discussed limit weight gain, diet modifications  - 1GTT, CMP, TSH, p/c ratio normal  -  ASA 1.5 daily will start  - L2U, 32 weeks growth, NST 36 wks        2. Incompetent cervix?  - cervical length   - cervical length 11w4d - 3.5 cm vaginal

## 2024-04-04 NOTE — PROGRESS NOTES
# [id number]  Pt here for cervical length  Pt c/o vag bleeding since last night. States she soaked 3 pads throughout the night, 1 today and now just having clear discharge. Pt denies leaking fluid, denies uterine cramping/tightening. Pt states + flutters.

## 2024-04-05 ENCOUNTER — HOSPITAL ENCOUNTER (INPATIENT)
Facility: HOSPITAL | Age: 29
LOS: 1 days | Discharge: HOME | End: 2024-04-06
Attending: OBSTETRICS & GYNECOLOGY | Admitting: OBSTETRICS & GYNECOLOGY
Payer: COMMERCIAL

## 2024-04-05 ENCOUNTER — APPOINTMENT (OUTPATIENT)
Dept: GENERAL RADIOLOGY | Facility: HOSPITAL | Age: 29
End: 2024-04-05
Attending: OBSTETRICS & GYNECOLOGY
Payer: COMMERCIAL

## 2024-04-05 ENCOUNTER — APPOINTMENT (OUTPATIENT)
Dept: OBGYN CLINIC | Facility: HOSPITAL | Age: 29
End: 2024-04-05
Payer: COMMERCIAL

## 2024-04-05 ENCOUNTER — ANESTHESIA (OUTPATIENT)
Dept: OBGYN UNIT | Facility: HOSPITAL | Age: 29
End: 2024-04-05
Payer: COMMERCIAL

## 2024-04-05 ENCOUNTER — ANESTHESIA EVENT (OUTPATIENT)
Dept: OBGYN UNIT | Facility: HOSPITAL | Age: 29
End: 2024-04-05
Payer: COMMERCIAL

## 2024-04-05 PROBLEM — Z98.890 S/P D&C (STATUS POST DILATION AND CURETTAGE): Status: ACTIVE | Noted: 2024-04-05

## 2024-04-05 PROBLEM — J18.9 PNEUMONIA OF BOTH UPPER LOBES DUE TO INFECTIOUS ORGANISM: Status: ACTIVE | Noted: 2024-04-05

## 2024-04-05 PROBLEM — O45.92 PLACENTAL ABRUPTION IN SECOND TRIMESTER (HCC): Status: ACTIVE | Noted: 2024-04-05

## 2024-04-05 PROBLEM — O03.4 INCOMPLETE SPONTANEOUS ABORTION (HCC): Status: ACTIVE | Noted: 2024-04-05

## 2024-04-05 PROBLEM — D68.9 COAGULOPATHY (HCC): Status: ACTIVE | Noted: 2024-04-05

## 2024-04-05 PROBLEM — L68.0 HIRSUTISM: Status: ACTIVE | Noted: 2024-04-05

## 2024-04-05 PROBLEM — Z75.8 LANGUAGE BARRIER: Status: ACTIVE | Noted: 2024-04-05

## 2024-04-05 PROBLEM — O09.292: Status: ACTIVE | Noted: 2024-04-05

## 2024-04-05 PROBLEM — O03.4 INCOMPLETE ABORTION (HCC): Status: ACTIVE | Noted: 2024-04-05

## 2024-04-05 PROBLEM — Z60.3 LANGUAGE BARRIER: Status: ACTIVE | Noted: 2024-04-05

## 2024-04-05 PROBLEM — Z34.90 PREGNANCY (HCC): Status: ACTIVE | Noted: 2024-04-05

## 2024-04-05 LAB
ADENOVIRUS PCR:: NOT DETECTED
ALBUMIN SERPL-MCNC: 2.4 G/DL (ref 3.4–5)
ALBUMIN SERPL-MCNC: 2.5 G/DL (ref 3.4–5)
ALBUMIN SERPL-MCNC: 3.1 G/DL (ref 3.4–5)
ALBUMIN/GLOB SERPL: 0.8 {RATIO} (ref 1–2)
ALP LIVER SERPL-CCNC: 71 U/L
ALP LIVER SERPL-CCNC: 75 U/L
ALP LIVER SERPL-CCNC: 89 U/L
ALT SERPL-CCNC: 40 U/L
ALT SERPL-CCNC: 44 U/L
ALT SERPL-CCNC: 54 U/L
AMPHET UR QL SCN: NEGATIVE
ANION GAP SERPL CALC-SCNC: 4 MMOL/L (ref 0–18)
ANION GAP SERPL CALC-SCNC: 8 MMOL/L (ref 0–18)
ANION GAP SERPL CALC-SCNC: 9 MMOL/L (ref 0–18)
ANTIBODY SCREEN: NEGATIVE
APTT PPP: 23.3 SECONDS (ref 23.3–35.6)
APTT PPP: 28.6 SECONDS (ref 23.3–35.6)
APTT PPP: 33.6 SECONDS (ref 23.3–35.6)
APTT PPP: 40.1 SECONDS (ref 23.3–35.6)
AST SERPL-CCNC: 23 U/L (ref 15–37)
AST SERPL-CCNC: 26 U/L (ref 15–37)
AST SERPL-CCNC: 27 U/L (ref 15–37)
B PARAPERT DNA SPEC QL NAA+PROBE: NOT DETECTED
B PERT DNA SPEC QL NAA+PROBE: NOT DETECTED
BASOPHILS # BLD AUTO: 0.04 X10(3) UL (ref 0–0.2)
BASOPHILS NFR BLD AUTO: 0.3 %
BASOPHILS NFR BLD AUTO: 0.3 %
BASOPHILS NFR BLD AUTO: 0.4 %
BILIRUB SERPL-MCNC: 0.2 MG/DL (ref 0.1–2)
BILIRUB SERPL-MCNC: 0.3 MG/DL (ref 0.1–2)
BILIRUB SERPL-MCNC: 0.4 MG/DL (ref 0.1–2)
BUN BLD-MCNC: 4 MG/DL (ref 9–23)
BUN BLD-MCNC: 5 MG/DL (ref 9–23)
BUN BLD-MCNC: 5 MG/DL (ref 9–23)
C PNEUM DNA SPEC QL NAA+PROBE: NOT DETECTED
CALCIUM BLD-MCNC: 7.6 MG/DL (ref 8.5–10.1)
CALCIUM BLD-MCNC: 8 MG/DL (ref 8.5–10.1)
CALCIUM BLD-MCNC: 8.9 MG/DL (ref 8.5–10.1)
CANNABINOIDS UR QL SCN: NEGATIVE
CHLORIDE SERPL-SCNC: 107 MMOL/L (ref 98–112)
CHLORIDE SERPL-SCNC: 108 MMOL/L (ref 98–112)
CHLORIDE SERPL-SCNC: 112 MMOL/L (ref 98–112)
CO2 SERPL-SCNC: 20 MMOL/L (ref 21–32)
CO2 SERPL-SCNC: 22 MMOL/L (ref 21–32)
CO2 SERPL-SCNC: 25 MMOL/L (ref 21–32)
COCAINE UR QL: NEGATIVE
CORONAVIRUS 229E PCR:: NOT DETECTED
CORONAVIRUS HKU1 PCR:: NOT DETECTED
CORONAVIRUS NL63 PCR:: NOT DETECTED
CORONAVIRUS OC43 PCR:: NOT DETECTED
CREAT BLD-MCNC: 0.71 MG/DL
CREAT BLD-MCNC: 0.77 MG/DL
CREAT BLD-MCNC: 0.79 MG/DL
CREAT UR-SCNC: 115 MG/DL
EGFRCR SERPLBLD CKD-EPI 2021: 105 ML/MIN/1.73M2 (ref 60–?)
EGFRCR SERPLBLD CKD-EPI 2021: 108 ML/MIN/1.73M2 (ref 60–?)
EGFRCR SERPLBLD CKD-EPI 2021: 119 ML/MIN/1.73M2 (ref 60–?)
EOSINOPHIL # BLD AUTO: 0.01 X10(3) UL (ref 0–0.7)
EOSINOPHIL # BLD AUTO: 0.02 X10(3) UL (ref 0–0.7)
EOSINOPHIL # BLD AUTO: 0.06 X10(3) UL (ref 0–0.7)
EOSINOPHIL NFR BLD AUTO: 0.1 %
EOSINOPHIL NFR BLD AUTO: 0.2 %
EOSINOPHIL NFR BLD AUTO: 0.5 %
ERYTHROCYTE [DISTWIDTH] IN BLOOD BY AUTOMATED COUNT: 11.9 %
ERYTHROCYTE [DISTWIDTH] IN BLOOD BY AUTOMATED COUNT: 12 %
ERYTHROCYTE [DISTWIDTH] IN BLOOD BY AUTOMATED COUNT: 12.6 %
EST. AVERAGE GLUCOSE BLD GHB EST-MCNC: 105 MG/DL (ref 68–126)
FIBRINOGEN PPP-MCNC: 358 MG/DL (ref 180–480)
FIBRINOGEN PPP-MCNC: 380 MG/DL (ref 180–480)
FIBRINOGEN PPP-MCNC: 415 MG/DL (ref 180–480)
FLUAV RNA SPEC QL NAA+PROBE: NOT DETECTED
FLUBV RNA SPEC QL NAA+PROBE: NOT DETECTED
GLOBULIN PLAS-MCNC: 3.1 G/DL (ref 2.8–4.4)
GLOBULIN PLAS-MCNC: 3.3 G/DL (ref 2.8–4.4)
GLOBULIN PLAS-MCNC: 3.9 G/DL (ref 2.8–4.4)
GLUCOSE BLD-MCNC: 100 MG/DL (ref 70–99)
GLUCOSE BLD-MCNC: 109 MG/DL (ref 70–99)
GLUCOSE BLD-MCNC: 92 MG/DL (ref 70–99)
HBA1C MFR BLD: 5.3 % (ref ?–5.7)
HCT VFR BLD AUTO: 28.8 %
HCT VFR BLD AUTO: 31.8 %
HCT VFR BLD AUTO: 35.4 %
HGB BLD-MCNC: 10.3 G/DL
HGB BLD-MCNC: 11.2 G/DL
HGB BLD-MCNC: 12.7 G/DL
HIV 1+2 AB+HIV1 P24 AG SERPL QL IA: NONREACTIVE
IMM GRANULOCYTES # BLD AUTO: 0.06 X10(3) UL (ref 0–1)
IMM GRANULOCYTES # BLD AUTO: 0.08 X10(3) UL (ref 0–1)
IMM GRANULOCYTES # BLD AUTO: 0.12 X10(3) UL (ref 0–1)
IMM GRANULOCYTES NFR BLD: 0.7 %
IMM GRANULOCYTES NFR BLD: 0.7 %
IMM GRANULOCYTES NFR BLD: 0.8 %
INR BLD: 1.11 (ref 0.8–1.2)
INR BLD: 1.18 (ref 0.8–1.2)
INR BLD: 1.46 (ref 0.85–1.16)
INR BLD: 1.51 (ref 0.8–1.2)
KLEIHAUER BETKE RESULT: NEGATIVE
LA 3 SCREEN W REFLEX-IMP: NEGATIVE
LYMPHOCYTES # BLD AUTO: 1.83 X10(3) UL (ref 1–4)
LYMPHOCYTES # BLD AUTO: 1.9 X10(3) UL (ref 1–4)
LYMPHOCYTES # BLD AUTO: 2.26 X10(3) UL (ref 1–4)
LYMPHOCYTES NFR BLD AUTO: 13.3 %
LYMPHOCYTES NFR BLD AUTO: 19.2 %
LYMPHOCYTES NFR BLD AUTO: 20 %
MCH RBC QN AUTO: 31.3 PG (ref 26–34)
MCH RBC QN AUTO: 32 PG (ref 26–34)
MCH RBC QN AUTO: 32.1 PG (ref 26–34)
MCHC RBC AUTO-ENTMCNC: 35.2 G/DL (ref 31–37)
MCHC RBC AUTO-ENTMCNC: 35.8 G/DL (ref 31–37)
MCHC RBC AUTO-ENTMCNC: 35.9 G/DL (ref 31–37)
MCV RBC AUTO: 88.8 FL
MCV RBC AUTO: 89.4 FL
MCV RBC AUTO: 89.4 FL
MDMA UR QL SCN: NEGATIVE
METAPNEUMOVIRUS PCR:: NOT DETECTED
MONOCYTES # BLD AUTO: 0.63 X10(3) UL (ref 0.1–1)
MONOCYTES # BLD AUTO: 0.89 X10(3) UL (ref 0.1–1)
MONOCYTES # BLD AUTO: 0.99 X10(3) UL (ref 0.1–1)
MONOCYTES NFR BLD AUTO: 6.2 %
MONOCYTES NFR BLD AUTO: 6.9 %
MONOCYTES NFR BLD AUTO: 8.4 %
MYCOPLASMA PNEUMONIA PCR:: NOT DETECTED
NEUTROPHILS # BLD AUTO: 11.33 X10 (3) UL (ref 1.5–7.7)
NEUTROPHILS # BLD AUTO: 11.33 X10(3) UL (ref 1.5–7.7)
NEUTROPHILS # BLD AUTO: 6.57 X10 (3) UL (ref 1.5–7.7)
NEUTROPHILS # BLD AUTO: 6.57 X10(3) UL (ref 1.5–7.7)
NEUTROPHILS # BLD AUTO: 8.37 X10 (3) UL (ref 1.5–7.7)
NEUTROPHILS # BLD AUTO: 8.37 X10(3) UL (ref 1.5–7.7)
NEUTROPHILS NFR BLD AUTO: 70.9 %
NEUTROPHILS NFR BLD AUTO: 71.8 %
NEUTROPHILS NFR BLD AUTO: 79.3 %
OPIATES UR QL SCN: NEGATIVE
OSMOLALITY SERPL CALC.SUM OF ELEC: 281 MOSM/KG (ref 275–295)
OSMOLALITY SERPL CALC.SUM OF ELEC: 284 MOSM/KG (ref 275–295)
OSMOLALITY SERPL CALC.SUM OF ELEC: 287 MOSM/KG (ref 275–295)
OXYCODONE UR QL SCN: NEGATIVE
PARAINFLUENZA 1 PCR:: NOT DETECTED
PARAINFLUENZA 2 PCR:: NOT DETECTED
PARAINFLUENZA 3 PCR:: NOT DETECTED
PARAINFLUENZA 4 PCR:: NOT DETECTED
PLATELET # BLD AUTO: 226 10(3)UL (ref 150–450)
PLATELET # BLD AUTO: 226 10(3)UL (ref 150–450)
PLATELET # BLD AUTO: 289 10(3)UL (ref 150–450)
PLATELETS.RETICULATED NFR BLD AUTO: 1.7 % (ref 0–7)
POTASSIUM SERPL-SCNC: 3.3 MMOL/L (ref 3.5–5.1)
POTASSIUM SERPL-SCNC: 3.6 MMOL/L (ref 3.5–5.1)
POTASSIUM SERPL-SCNC: 3.7 MMOL/L (ref 3.5–5.1)
PROCALCITONIN SERPL-MCNC: 0.14 NG/ML (ref ?–0.16)
PROT SERPL-MCNC: 5.5 G/DL (ref 6.4–8.2)
PROT SERPL-MCNC: 5.8 G/DL (ref 6.4–8.2)
PROT SERPL-MCNC: 7 G/DL (ref 6.4–8.2)
PROTHROMBIN TIME: 14.3 SECONDS (ref 11.6–14.8)
PROTHROMBIN TIME: 15.1 SECONDS (ref 11.6–14.8)
PROTHROMBIN TIME: 17.9 SECONDS (ref 11.6–14.8)
PROTHROMBIN TIME: 18.3 SECONDS (ref 11.6–14.8)
RBC # BLD AUTO: 3.22 X10(6)UL
RBC # BLD AUTO: 3.58 X10(6)UL
RBC # BLD AUTO: 3.96 X10(6)UL
RH BLOOD TYPE: POSITIVE
RHINOVIRUS/ENTERO PCR:: NOT DETECTED
RSV RNA SPEC QL NAA+PROBE: NOT DETECTED
SARS-COV-2 RNA NPH QL NAA+NON-PROBE: NOT DETECTED
SCREEN DRVVT: 1.43 S (ref 0–1.29)
SCREEN DRVVT: POSITIVE S
SODIUM SERPL-SCNC: 137 MMOL/L (ref 136–145)
SODIUM SERPL-SCNC: 138 MMOL/L (ref 136–145)
SODIUM SERPL-SCNC: 140 MMOL/L (ref 136–145)
STACLOT LA DELTA: 1.6 SECONDS (ref ?–8)
T PALLIDUM AB SER QL IA: NONREACTIVE
TSI SER-ACNC: 1.49 MIU/ML (ref 0.36–3.74)
WBC # BLD AUTO: 11.8 X10(3) UL (ref 4–11)
WBC # BLD AUTO: 14.3 X10(3) UL (ref 4–11)
WBC # BLD AUTO: 9.2 X10(3) UL (ref 4–11)

## 2024-04-05 PROCEDURE — 36430 TRANSFUSION BLD/BLD COMPNT: CPT | Performed by: STUDENT IN AN ORGANIZED HEALTH CARE EDUCATION/TRAINING PROGRAM

## 2024-04-05 PROCEDURE — 30233N1 TRANSFUSION OF NONAUTOLOGOUS RED BLOOD CELLS INTO PERIPHERAL VEIN, PERCUTANEOUS APPROACH: ICD-10-PCS | Performed by: OBSTETRICS & GYNECOLOGY

## 2024-04-05 PROCEDURE — 10D17ZZ EXTRACTION OF PRODUCTS OF CONCEPTION, RETAINED, VIA NATURAL OR ARTIFICIAL OPENING: ICD-10-PCS | Performed by: OBSTETRICS & GYNECOLOGY

## 2024-04-05 PROCEDURE — 30233K1 TRANSFUSION OF NONAUTOLOGOUS FROZEN PLASMA INTO PERIPHERAL VEIN, PERCUTANEOUS APPROACH: ICD-10-PCS | Performed by: OBSTETRICS & GYNECOLOGY

## 2024-04-05 PROCEDURE — 59812 TREATMENT OF MISCARRIAGE: CPT | Performed by: OBSTETRICS & GYNECOLOGY

## 2024-04-05 PROCEDURE — 71045 X-RAY EXAM CHEST 1 VIEW: CPT | Performed by: OBSTETRICS & GYNECOLOGY

## 2024-04-05 PROCEDURE — 0W3R7ZZ CONTROL BLEEDING IN GENITOURINARY TRACT, VIA NATURAL OR ARTIFICIAL OPENING: ICD-10-PCS | Performed by: OBSTETRICS & GYNECOLOGY

## 2024-04-05 PROCEDURE — 99255 IP/OBS CONSLTJ NEW/EST HI 80: CPT | Performed by: HOSPITALIST

## 2024-04-05 RX ORDER — MISOPROSTOL 200 UG/1
TABLET ORAL
Status: DISPENSED
Start: 2024-04-05 | End: 2024-04-05

## 2024-04-05 RX ORDER — CEFAZOLIN SODIUM/WATER 2 G/20 ML
SYRINGE (ML) INTRAVENOUS
Status: COMPLETED
Start: 2024-04-05 | End: 2024-04-05

## 2024-04-05 RX ORDER — MISOPROSTOL 200 UG/1
600 TABLET ORAL EVERY 6 HOURS SCHEDULED
Status: DISCONTINUED | OUTPATIENT
Start: 2024-04-05 | End: 2024-04-05

## 2024-04-05 RX ORDER — CEFAZOLIN SODIUM 1 G/3ML
INJECTION, POWDER, FOR SOLUTION INTRAMUSCULAR; INTRAVENOUS AS NEEDED
Status: DISCONTINUED | OUTPATIENT
Start: 2024-04-05 | End: 2024-04-05 | Stop reason: SURG

## 2024-04-05 RX ORDER — CITRIC ACID/SODIUM CITRATE 334-500MG
30 SOLUTION, ORAL ORAL AS NEEDED
Status: DISCONTINUED | OUTPATIENT
Start: 2024-04-05 | End: 2024-04-06

## 2024-04-05 RX ORDER — ONDANSETRON 2 MG/ML
4 INJECTION INTRAMUSCULAR; INTRAVENOUS ONCE AS NEEDED
Status: ACTIVE | OUTPATIENT
Start: 2024-04-05 | End: 2024-04-05

## 2024-04-05 RX ORDER — SODIUM CHLORIDE, SODIUM LACTATE, POTASSIUM CHLORIDE, CALCIUM CHLORIDE 600; 310; 30; 20 MG/100ML; MG/100ML; MG/100ML; MG/100ML
INJECTION, SOLUTION INTRAVENOUS CONTINUOUS
Status: DISCONTINUED | OUTPATIENT
Start: 2024-04-05 | End: 2024-04-06

## 2024-04-05 RX ORDER — TERBUTALINE SULFATE 1 MG/ML
0.25 INJECTION, SOLUTION SUBCUTANEOUS AS NEEDED
Status: DISCONTINUED | OUTPATIENT
Start: 2024-04-05 | End: 2024-04-06

## 2024-04-05 RX ORDER — IBUPROFEN 600 MG/1
600 TABLET ORAL ONCE AS NEEDED
Status: DISCONTINUED | OUTPATIENT
Start: 2024-04-05 | End: 2024-04-06

## 2024-04-05 RX ORDER — ACETAMINOPHEN 500 MG
1000 TABLET ORAL EVERY 6 HOURS PRN
Status: DISCONTINUED | OUTPATIENT
Start: 2024-04-05 | End: 2024-04-06

## 2024-04-05 RX ORDER — SODIUM CHLORIDE 9 MG/ML
INJECTION, SOLUTION INTRAVENOUS ONCE
Status: COMPLETED | OUTPATIENT
Start: 2024-04-05 | End: 2024-04-05

## 2024-04-05 RX ORDER — DEXTROSE, SODIUM CHLORIDE, SODIUM LACTATE, POTASSIUM CHLORIDE, AND CALCIUM CHLORIDE 5; .6; .31; .03; .02 G/100ML; G/100ML; G/100ML; G/100ML; G/100ML
INJECTION, SOLUTION INTRAVENOUS AS NEEDED
Status: DISCONTINUED | OUTPATIENT
Start: 2024-04-05 | End: 2024-04-06

## 2024-04-05 RX ORDER — ONDANSETRON 2 MG/ML
INJECTION INTRAMUSCULAR; INTRAVENOUS AS NEEDED
Status: DISCONTINUED | OUTPATIENT
Start: 2024-04-05 | End: 2024-04-05 | Stop reason: SURG

## 2024-04-05 RX ORDER — SODIUM CHLORIDE 9 MG/ML
INJECTION, SOLUTION INTRAVENOUS ONCE
Status: DISCONTINUED | OUTPATIENT
Start: 2024-04-05 | End: 2024-04-06

## 2024-04-05 RX ORDER — HYDROMORPHONE HYDROCHLORIDE 1 MG/ML
INJECTION, SOLUTION INTRAMUSCULAR; INTRAVENOUS; SUBCUTANEOUS
Status: DISPENSED
Start: 2024-04-05 | End: 2024-04-05

## 2024-04-05 RX ORDER — NALBUPHINE HYDROCHLORIDE 10 MG/ML
2.5 INJECTION, SOLUTION INTRAMUSCULAR; INTRAVENOUS; SUBCUTANEOUS
Status: DISCONTINUED | OUTPATIENT
Start: 2024-04-05 | End: 2024-04-06

## 2024-04-05 RX ORDER — TRANEXAMIC ACID 10 MG/ML
INJECTION, SOLUTION INTRAVENOUS
Status: DISPENSED
Start: 2024-04-05 | End: 2024-04-05

## 2024-04-05 RX ORDER — SODIUM CHLORIDE, SODIUM LACTATE, POTASSIUM CHLORIDE, CALCIUM CHLORIDE 600; 310; 30; 20 MG/100ML; MG/100ML; MG/100ML; MG/100ML
INJECTION, SOLUTION INTRAVENOUS CONTINUOUS PRN
Status: DISCONTINUED | OUTPATIENT
Start: 2024-04-05 | End: 2024-04-05 | Stop reason: SURG

## 2024-04-05 RX ORDER — HYDROMORPHONE HYDROCHLORIDE 1 MG/ML
INJECTION, SOLUTION INTRAMUSCULAR; INTRAVENOUS; SUBCUTANEOUS
Status: DISPENSED
Start: 2024-04-05 | End: 2024-04-06

## 2024-04-05 RX ORDER — ONDANSETRON 2 MG/ML
4 INJECTION INTRAMUSCULAR; INTRAVENOUS EVERY 6 HOURS PRN
Status: DISCONTINUED | OUTPATIENT
Start: 2024-04-05 | End: 2024-04-06

## 2024-04-05 RX ORDER — LIDOCAINE HYDROCHLORIDE 10 MG/ML
INJECTION, SOLUTION EPIDURAL; INFILTRATION; INTRACAUDAL; PERINEURAL AS NEEDED
Status: DISCONTINUED | OUTPATIENT
Start: 2024-04-05 | End: 2024-04-05 | Stop reason: SURG

## 2024-04-05 RX ORDER — MIDAZOLAM HYDROCHLORIDE 1 MG/ML
INJECTION INTRAMUSCULAR; INTRAVENOUS AS NEEDED
Status: DISCONTINUED | OUTPATIENT
Start: 2024-04-05 | End: 2024-04-05 | Stop reason: SURG

## 2024-04-05 RX ORDER — HYDROMORPHONE HYDROCHLORIDE 1 MG/ML
0.2 INJECTION, SOLUTION INTRAMUSCULAR; INTRAVENOUS; SUBCUTANEOUS EVERY 5 MIN PRN
Status: ACTIVE | OUTPATIENT
Start: 2024-04-05 | End: 2024-04-06

## 2024-04-05 RX ORDER — METHYLERGONOVINE MALEATE 0.2 MG/ML
INJECTION INTRAVENOUS
Status: COMPLETED
Start: 2024-04-05 | End: 2024-04-05

## 2024-04-05 RX ORDER — CEFAZOLIN SODIUM/WATER 2 G/20 ML
2 SYRINGE (ML) INTRAVENOUS EVERY 8 HOURS
Status: DISCONTINUED | OUTPATIENT
Start: 2024-04-05 | End: 2024-04-05

## 2024-04-05 RX ORDER — ACETAMINOPHEN 500 MG
500 TABLET ORAL EVERY 6 HOURS PRN
Status: DISCONTINUED | OUTPATIENT
Start: 2024-04-05 | End: 2024-04-06

## 2024-04-05 RX ORDER — HYDROMORPHONE HYDROCHLORIDE 1 MG/ML
0.4 INJECTION, SOLUTION INTRAMUSCULAR; INTRAVENOUS; SUBCUTANEOUS EVERY 5 MIN PRN
Status: ACTIVE | OUTPATIENT
Start: 2024-04-05 | End: 2024-04-06

## 2024-04-05 RX ADMIN — MIDAZOLAM HYDROCHLORIDE 2 MG: 1 INJECTION INTRAMUSCULAR; INTRAVENOUS at 10:06:00

## 2024-04-05 RX ADMIN — ONDANSETRON 4 MG: 2 INJECTION INTRAMUSCULAR; INTRAVENOUS at 10:13:00

## 2024-04-05 RX ADMIN — SODIUM CHLORIDE, SODIUM LACTATE, POTASSIUM CHLORIDE, CALCIUM CHLORIDE: 600; 310; 30; 20 INJECTION, SOLUTION INTRAVENOUS at 10:00:00

## 2024-04-05 RX ADMIN — CEFAZOLIN SODIUM 2 G: 1 INJECTION, POWDER, FOR SOLUTION INTRAMUSCULAR; INTRAVENOUS at 10:06:00

## 2024-04-05 RX ADMIN — LIDOCAINE HYDROCHLORIDE 50 MG: 10 INJECTION, SOLUTION EPIDURAL; INFILTRATION; INTRACAUDAL; PERINEURAL at 10:06:00

## 2024-04-05 RX ADMIN — SODIUM CHLORIDE, SODIUM LACTATE, POTASSIUM CHLORIDE, CALCIUM CHLORIDE: 600; 310; 30; 20 INJECTION, SOLUTION INTRAVENOUS at 11:34:00

## 2024-04-05 NOTE — PROGRESS NOTES
OB attending    Hospitalist has been consulted due to concern for possible aspiration  -azithromycin & unasyn per hospitalist     Total QBL 1140 mL   Labs drawn at 1831 pending.     Vitals:    04/05/24 1515 04/05/24 1545 04/05/24 1615 04/05/24 1715   BP: 134/68 129/63 94/68 138/63   BP Location:       Pulse: 82 77 83 110   Resp:       Temp:       TempSrc:       SpO2: 97% 97% 97% 96%   Weight:       On room air     Ellyn Tinoco MD

## 2024-04-05 NOTE — PROGRESS NOTES
Pt seen at  in wheelchair holding fetus wrapped in a blanket. Mal[place]ian  used. Pt transferred to 120, states that she got up at 0630 passed the fetus without contractions or cramping. Pt denies passing the placenta. No signs of life observed from fetus upon arrival at 0741 and confirmed with RENARD Hebert.     Dr. Tinoco notified.  Bedside ultrasound performed and placenta remains. Moderate vaginal bleeding. IV Pitocin started and 600 mcg of buccal Cytotec given. Pt declines waiting to pass placenta and elects to have D&C. Pt consented for D&C.

## 2024-04-05 NOTE — PROGRESS NOTES
Pt is a 28 year old female admitted to 120/120-A.     Chief Complaint   Patient presents with    Iufd      Pt is  15w4d intra-uterine pregnancy.  History obtained, consents signed. Oriented to room, staff, and plan of care.

## 2024-04-05 NOTE — CM/SW NOTE
Social work order acknowledged. Patient currently in OR. SW to complete order.   SW to remain available for future order, if need and patient medically stable.  Sydni Soria, Bereavement Coordinator to follow up with patient to offer support and bereavement resources/support.     Indy Ocampo, [facility]  /Discharge Planner

## 2024-04-05 NOTE — ANESTHESIA PROCEDURE NOTES
Airway  Date/Time: 4/5/2024 10:36 AM  Urgency: elective    Airway not difficult    General Information and Staff    Patient location during procedure: OR  Anesthesiologist: Argentina Dang MD  Performed: anesthesiologist   Performed by: Argentina Dang MD  Authorized by: Argentina Dang MD      Indications and Patient Condition  Indications for airway management: anesthesia  Spontaneous Ventilation: absent  Sedation level: deep  Preoxygenated: yes  Patient position: sniffing  Mask difficulty assessment: 2 - vent by mask + OA or adjuvant +/- NMBA    Final Airway Details  Final airway type: endotracheal airway      Successful airway: ETT  Cuffed: yes   Successful intubation technique: Video laryngoscopy  Facilitating devices/methods: intubating stylet  Endotracheal tube insertion site: oral  Blade: GlideScope  Blade size: #3  ETT size (mm): 7.0    Cormack-Lehane Classification: grade I - full view of glottis  Placement verified by: capnometry   Cuff volume (mL): 5  Measured from: lips  ETT to lips (cm): 22  Number of attempts at approach: 1  Number of other approaches attempted: 0

## 2024-04-05 NOTE — PROGRESS NOTES
OB attending    Some additional labs have resulted - elevated INR 1.5, but fibrinogen ok.   Anesthesiologist Dr. Dang aware    Will type & cross 2 units PRBC    Component      Latest Ref Rng 4/5/2024  7:57 AM 4/5/2024  8:05 AM   WBC      4.0 - 11.0 x10(3) uL 11.8 (H)     RBC      3.80 - 5.30 x10(6)uL 3.96     Hemoglobin      12.0 - 16.0 g/dL 12.7     Hematocrit      35.0 - 48.0 % 35.4     Platelet Count      150.0 - 450.0 10(3)uL 289.0     MCV      80.0 - 100.0 fL 89.4     MCH      26.0 - 34.0 pg 32.1     MCHC      31.0 - 37.0 g/dL 35.9     RDW      % 11.9     Prelim Neutrophil Abs      1.50 - 7.70 x10 (3) uL 8.37 (H)     Neutrophils Absolute      1.50 - 7.70 x10(3) uL 8.37 (H)     Lymphocytes Absolute      1.00 - 4.00 x10(3) uL 2.26     Monocytes Absolute      0.10 - 1.00 x10(3) uL 0.99     Eosinophils Absolute      0.00 - 0.70 x10(3) uL 0.06     Basophils Absolute      0.00 - 0.20 x10(3) uL 0.04     Immature Granulocyte Absolute      0.00 - 1.00 x10(3) uL 0.08     Neutrophils %      % 70.9     Lymphocytes %      % 19.2     Monocytes %      % 8.4     Eosinophils %      % 0.5     Basophils %      % 0.3     Immature Granulocyte %      % 0.7     Glucose      70 - 99 mg/dL 109 (H)     Sodium      136 - 145 mmol/L 138     Potassium      3.5 - 5.1 mmol/L 3.6     Chloride      98 - 112 mmol/L 107     Carbon Dioxide, Total      21.0 - 32.0 mmol/L 22.0     ANION GAP      0 - 18 mmol/L 9     BUN      9 - 23 mg/dL 5 (L)     CREATININE      0.55 - 1.02 mg/dL 0.79     CALCIUM      8.5 - 10.1 mg/dL 8.9     CALCULATED OSMOLALITY      275 - 295 mOsm/kg 284     EGFR      >=60 mL/min/1.73m2 105     AST (SGOT)      15 - 37 U/L 27     ALT (SGPT)      13 - 56 U/L 54     ALKALINE PHOSPHATASE      37 - 98 U/L 89     Total Bilirubin      0.1 - 2.0 mg/dL 0.3     PROTEIN, TOTAL      6.4 - 8.2 g/dL 7.0     Albumin      3.4 - 5.0 g/dL 3.1 (L)     Globulin      2.8 - 4.4 g/dL 3.9     A/G Ratio      1.0 - 2.0  0.8 (L)     PT      11.6 - 14.8  seconds  18.3 (H)    INR      0.80 - 1.20   1.51 (H)    APTT      23.3 - 35.6 seconds  33.6    Fibrinogen      180 - 480 mg/dl  358      Ellyn Tinoco MD

## 2024-04-05 NOTE — ANESTHESIA PREPROCEDURE EVALUATION
PRE-OP EVALUATION    Patient Name: Maral Young    Admit Diagnosis: pregnancy  Pregnancy (HCC)    Pre-op Diagnosis: * No pre-op diagnosis entered *    DILATION AND CURETTAGE    Anesthesia Procedure: DILATION AND CURETTAGE    Surgeon(s) and Role:     * Ellyn Tinoco MD - Primary    Pre-op vitals reviewed.  Temp: 98 °F (36.7 °C)  Pulse: 100  Resp: 18  BP: 129/50  SpO2: 97 %  There is no height or weight on file to calculate BMI.    Current medications reviewed.  Hospital Medications:   oxyTOCIN in sodium chloride 0.9% (Pitocin) 30 Units/500mL infusion premix        lactated ringers infusion   Intravenous Continuous    dextrose in lactated ringers 5% infusion   Intravenous PRN    lactated ringers IV bolus 500 mL  500 mL Intravenous PRN    acetaminophen (Tylenol Extra Strength) tab 500 mg  500 mg Oral Q6H PRN    acetaminophen (Tylenol Extra Strength) tab 1,000 mg  1,000 mg Oral Q6H PRN    ibuprofen (Motrin) tab 600 mg  600 mg Oral Once PRN    ondansetron (Zofran) 4 MG/2ML injection 4 mg  4 mg Intravenous Q6H PRN    oxyTOCIN in sodium chloride 0.9% (Pitocin) 30 Units/500mL infusion premix  62.5-900 amena-units/min Intravenous Continuous    terbutaline (Brethine) 1 MG/ML injection 0.25 mg  0.25 mg Subcutaneous PRN    sodium citrate-citric acid (Bicitra) 500-334 MG/5ML oral solution 30 mL  30 mL Oral PRN    miSOPROStol (Cytotec) 200 MCG tab        tranexamic acid in sodium chloride 0.7% (Cyklokapron) 1000 mg/100mL infusion premix        miSOPROStol (Cytotec) tab 600 mcg  600 mcg Oral 4 times per day    ceFAZolin (Ancef) 2 g/20mL IV syringe premix           Outpatient Medications:     Medications Prior to Admission   Medication Sig Dispense Refill Last Dose    cephalexin 500 MG Oral Cap    4/4/2024    ASPIRIN OR Take 81 mg by mouth.   Past Week    progesterone 200 MG Oral Cap Take 1 capsule (200 mg total) by mouth nightly. TAKE AT BEDTIME 30 capsule 0 4/4/2024    prenatal vitamin with DHA 27-0.8-228 MG Oral Cap Take  1 capsule by mouth daily.   4/4/2024       Allergies: Patient has no known allergies.      Anesthesia Evaluation    Patient summary reviewed.    Anesthetic Complications  (-) history of anesthetic complications         GI/Hepatic/Renal    Negative GI/hepatic/renal ROS.                             Cardiovascular        Exercise tolerance: good     MET: >4    (+) obesity                                       Endo/Other    Negative endo/other ROS.                              Pulmonary    Negative pulmonary ROS.                       Neuro/Psych    Negative neuro/psych ROS.                                  Past Surgical History:   Procedure Laterality Date    APPENDECTOMY  2004     Social History     Socioeconomic History    Marital status:    Tobacco Use    Smoking status: Never    Smokeless tobacco: Never   Substance and Sexual Activity    Alcohol use: Not Currently    Drug use: Never     History   Drug Use Unknown     Available pre-op labs reviewed.  Lab Results   Component Value Date    WBC 11.8 (H) 04/05/2024    RBC 3.96 04/05/2024    HGB 12.7 04/05/2024    HCT 35.4 04/05/2024    MCV 89.4 04/05/2024    MCH 32.1 04/05/2024    MCHC 35.9 04/05/2024    RDW 11.9 04/05/2024    .0 04/05/2024     Lab Results   Component Value Date     02/08/2024    K 3.6 02/08/2024     02/08/2024    CO2 25.0 02/08/2024    BUN 8 (L) 02/08/2024    CREATSERUM 0.93 02/08/2024     (H) 02/08/2024    CA 9.0 02/08/2024            Airway      Mallampati: II  Mouth opening: >3 FB  TM distance: > 6 cm  Neck ROM: full Cardiovascular    Cardiovascular exam normal.         Dental             Pulmonary    Pulmonary exam normal.                 Other findings              ASA: 2   Plan: MAC  NPO status verified and patient meets guidelines.    Post-procedure pain management plan discussed with surgeon and patient.    Comment: A detailed discussion about the anesthetic plan was held with Maral Young in the preoperative  area. Benefits and risks of MAC anesthesia were discussed, including intraoperative awareness/recall, PONV, reasonable expectations of post-operative pain/discomfort, aspiration, conversion to general anesthesia, dental injury, pressure/nerve injuries from positioning, and other serious but rare complications (life-threatening cardiopulmonary events). All questions were answered appropriately and patient demonstrated understanding of realistic expectations and risks of undergoing anesthesia. Maral Young consents to receiving anesthesia and wishes to proceed.  Plan/risks discussed with: patient                Present on Admission:   Pregnancy (HCC)   Incomplete spontaneous  (HCC)   Prior poor obstetrical history in second trimester, antepartum (HCC)   Language barrier

## 2024-04-05 NOTE — ANESTHESIA POSTPROCEDURE EVALUATION
[facility]    Maral Young Patient Status:  Inpatient   Age/Gender 28 year old female MRN [id number]   Location [facility] LABOR & DELIVERY Attending Ellyn Tinoco MD   Hosp Day # 0 PCP None Pcp       Anesthesia Post-op Note    DILATION AND CURETTAGE    Procedure Summary       Date: 04/05/24 Room / Location:  L+D OR 01 / [facility] L+D OR    Anesthesia Start: 1000 Anesthesia Stop:     Procedure: DILATION AND CURETTAGE (Vagina ) Diagnosis:     Surgeons: Ellyn Tinoco MD Anesthesiologist: Argentina Dang MD    Anesthesia Type: MAC ASA Status: 2            Anesthesia Type: MAC    Vitals Value Taken Time   /117 04/05/24 1135   Temp 97.5 04/05/24 1136   Pulse 78 04/05/24 1136   Resp 17 04/05/24 1136   SpO2 100 % 04/05/24 1136   Vitals shown include unfiled device data.    Patient Location: Labor and Delivery    Anesthesia Type: general    Airway Patency: patent and extubated    Postop Pain Control: adequate    Mental Status: mildly sedated but able to meaningfully participate in the post-anesthesia evaluation    Nausea/Vomiting: none    Cardiopulmonary/Hydration status: stable euvolemic    Complications: no apparent anesthesia related complications    Postop vital signs: stable    Dental Exam: Unchanged from Preop    Patient to be discharged from PACU when criteria met.

## 2024-04-05 NOTE — PROGRESS NOTES
OB attending    Notified by RN CXR read suggestive of possible aspiration  Will consult hospitalist    Labs due at 6 pm.     Ellyn Tinoco MD

## 2024-04-05 NOTE — PROGRESS NOTES
Ob attending    Shahnaz device removed  Bleeding does not appear to be excessive at this time  O2 sat 97% on 2 liters nasal cannula    Ellyn Tinoco MD

## 2024-04-05 NOTE — ANESTHESIA PROCEDURE NOTES
Peripheral IV  Date/Time: 4/5/2024 10:32 AM  Inserted by: Argentina Dang MD    Placement  Needle size: 18 G  Laterality: left  Location: antecubital  Local anesthetic: none  Site prep: alcohol  Technique: ultrasound guided  Attempts: 1

## 2024-04-05 NOTE — OPERATIVE REPORT
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 Placenta was still in place. She was having some bleeding and reported she had a large amount of vaginal bleeding when she delivered the fetus earlier this morning. She elected surgical management with suction D&C.     PRE-OP DIAGNOSIS:   Incomplete spontaneous  at 15w4d   Retained placenta    POST-OP DIAGNOSIS:   Same    PROCEDURE(S):   Ultrasound guided suction dilatation and curettage of uterus   Placement of Shahnaz intrauterine device    ANESTHESIA: MAC converted to general anesthetic   ANTIBIOTICS: Cefazolin  OTHER MEDICATIONS: Misoprostol 600 mcg buccally (just prior to procedure), IV oxytocin, IM methergine    Blood products: 1 unit PRBC started transfusing during the procedure with plan to give 1 more unit PRBC & 1 FFP in recovery area.     SURGEON(S):Ellyn Tinoco MD     ESTIMATED BLOOD LOSS 400 mL from the procedure itself in the OR. This is not including the blood loss on her chux, etc prior to the procedure.            DRAINS: Vargas catheter in bladder. Shahnaz intrauterine device to wall suction            SPECIMENS: Placenta            IMPLANTS: None           COMPLICATIONS:  None apparent, though patient did start to vomit during the procedure and is suspected of aspirating some stomach acid.     FINDINGS: Limited bedside transabdominal ultrasound performed. External genitalia intact. Cervix dilated to about 4 cm. Fresh blood in vagina. Placental tissue obtained with suction. Specimen sent to pathology. Still having bright red bleeding. After Shahnaz device was placed, there was good hemostasis with the blood just partially into the drainage tube.     PROCEDURE: This  procedure was fully reviewed with the patient and written informed consent was obtained after discussing risks, benefits, indication and alternatives. All questions were answered.     The patient was taken to operative room. SCDs and IV antibiotics were administered. Anesthesia was administered. She was placed in dorsal lithotomy position. Limited bedside ultrasound performed. She was prepped and draped in normal sterile fashion. Vargas catheter was placed in bladder.     A weighted speculum was placed in the vagina. A ringed forceps was used to grasp the anterior lip of the cervix. A 12 mm curved suction curette was then gently advanced into the uterine cavity under ultrasound guidance and the vacuum was activated. Moderate amount of tissue was noted. This was repeated a few more times with decreasing to minimal tissue noted for each pass. The specimen was sent to pathology. Uterine bleeding was still bright red and more than expected from normal lochia. Decision made to start transfusion of packed red blood cells.    Around this point in the procedure the patient appeared to be vomiting and choking on stomach acid. Her MAC was converted to general anesthetic.     Shahnaz device placed with 120 mL saline into cervical occlusion balloon and placed to 80 mm Hg wall suction. Good hemostasis was noted. All instruments were then removed from the vagina. Sponge, lap, needle, and instrument counts correct by two counts. The patient was taken to recovery room in stable condition.     DISPOSITION:  To recovery room    CONDITION: Stable      Ellyn Tinoco MD  [facility] - OBGYN

## 2024-04-05 NOTE — L&D DELIVERY NOTE
Hannah, Girl FD [[id number]]      Labor Events     labor?: Yes   steroids?: None  Antibiotics received during labor?: No  Rupture date/time: 2024     Rupture type: SROM, Prolonged  Fluid color: Other (Comment)  Fluid color comment: Anhydramnios on ultrasound 24  Labor type: Spontaneous Onset of Labor  Augmentation: None  Intrapartum & labor complications: Fetal Demise, Abruptio Placenta, Other - see comments  Intrapartum & labor complications comment: Incomplete spontaneous . Patient with suspected chronic abruption, diagnosed with anhydramnios & cervical dilation 3 cm on 24.        Labor Event Times    Labor onset date/time: 2024 0630        Presentation    No data filed       Operative Delivery    Operative Vaginal Delivery: No                Shoulder Dystocia    Shoulder Dystocia: No       Anesthesia    Method: None               Delivery      Delivery date/time:  24 06:30:00   Delivery type: Normal spontaneous vaginal delivery    Details:     Delivery location: out of hospital       Delivery Providers       Delivery personnel:  Provider Role    Baby Nurse    Delivery Nurse             Cord    No data filed       Resuscitation    Method: None       [place] Measurements    No data filed       Placenta    Date/time: 2024 1020  Removal: Curettage  Appearance: Abnormal  Disposition: Pathology       Apgars    Living status: Fetal Demise   Apgar Scoring Key:    0 1 2    Skin color Blue or pale Acrocyanotic Completely pink    Heart rate Absent <100 bpm >100 bpm    Reflex irritability No response Grimace Cry or active withdrawal    Muscle tone Limp Some flexion Active motion    Respiratory effort Absent Weak cry; hypoventilation Good, crying              1 Minute:  5 Minute:  10 Minute:  15 Minute:  20 Minute:      Skin color: 0  0  0  0  0    Heart rate: 0  0  0  0  0    Reflex irritablity: 0  0  0  0  0    Muscle tone: 0  0  0  0  0    Respiratory  effort: 0  0  0  0  0    Total: 0  0  0  0  0            Skin to Skin    No data filed       Vaginal Count    No data filed       Lacerations    Episiotomy: None  Perineal lacerations: None      Vaginal laceration?: No      Cervical laceration?: No    Clitoral laceration?: No                28 year old  female at 15w4d presented to L&D after delivering her fetus at home this morning around 0630. She reported having a large gush of blood with the delivery. She presented with her placenta still within the uterus.     Poor OB history (2014 post-traumatic placental abruption & delivery at 24 wk, 2018 SAB at 6-7 wk,  reports had cerclage placed at 16 wk & was told placenta previa & less than 1 month later PPROM & chorioamnionitis.      Current pregnancy with suspected chronic placental abruption as she has had ongoing vaginal bleeding since her 5th week this pregnancy. Diagnosed by MFM with anhydramnios, premature cervical dilation (3 cm) with inevitable  diagnosed 24.      Limited bedside US performed by Dr. Tinoco with placenta still in situ with visible blood flow, but no obvious large collection of blood within uterus. Vaginal exam dilated 4-5 cm, 100% effaced, handful of clot removed, but placenta itself not delivering.      Patient preferred to proceed with ultrasound guided suction D&C to deliver the placenta and does not want an epidural.     See operative report for D&C procedure note.     Ellyn Tinoco MD

## 2024-04-05 NOTE — H&P
[facility]   part of [facility]    History & Physical    Maral Young Patient Status:  Inpatient    12/3/1995 MRN [id number]   Location [facility] LABOR & DELIVERY Attending Ellyn Tinoco MD   Hosp Day # 0 PCP None Pcp     Date of Admission:  2024  7:41 AM    HPI:   Maral Young is a 28 year old  at 15w4d - presents this morning after delivering her fetus at home this morning around 0630. Her placenta is retained. She is having some light to moderate vaginal bleeding.    Language barrier - Kiswahili  used    Current pregnancy complicated by poor OB history ( post-traumatic placental abruption & delivery at 24 wk, 2018 SAB at 6-7 wk,  reports had cerclage placed at 16 wk & was told placenta previa & less than 1 month later PPROM & chorioamnionitis.     This pregnancy with suspected chronic placental abruption as she has had ongoing vaginal bleeding since her 5th week this pregnancy.     Followed by [facility]    Diagnosed with anhydramnios, premature cervical dilation (3 cm) with inevitable  diagnosed 24 [facility]   Ultrasound Findings:  Single IUP in cephalic presentation.    Placenta is posterior.   A 3 vessel cord is noted.  Cardiac activity is present at 149 bpm  MVP is 0 cm  The cervix was not able to be clearly visualized and appeared short by transabdominal ultrasound, therefore transvaginal ultrasound was performed. Transvaginal US findings: Cervix is dilated    [facility] recommended termination given inevitable .     Patient was scheduled to come for induction of labor today but presented to L&D reporting that she passed the fetus at home at 0630 with large gush of blood at that time. Since then not as much bleeding. Placenta still in situ.     Limited bedside US performed by Dr. Tinoco with placenta still in situ with visible blood flow, but no obvious large collection of blood within uterus. Vaginal exam dilated 4-5 cm, 100% effaced,  handful of clot removed, but placenta itself not delivering.     Patient prefers to proceed with ultrasound guided suction D&C to deliver the placenta and does not want an epidural.     History   Obstetric History:   OB History    Para Term  AB Living   4 2   2 1     SAB IAB Ectopic Multiple Live Births   1              # Outcome Date GA Lbr Wilbert/2nd Weight Sex Delivery Anes PTL Lv   4 Current            3  22 19w0d    Vag-Forceps   FD      Complications: Chorioamnionitis, Other - see comments   2 SAB  6w0d          1  14 26w0d   M Vag-Spont   FD      Complications: Other - see comments     Past Medical History:   Past Medical History:   Diagnosis Date    Incomplete spontaneous  (HCC) 2024    Chronic placental abruption - premature dilation to 4 cm at 15w3d -> spontaneously delivered fetus at 15w4d on 2024. Retained placenta.     Language barrier 2024    Chinese  needed     labor (HCC)      Past Social History:   Past Surgical History:   Procedure Laterality Date    APPENDECTOMY       Family History: No family history on file.  Social History:   Social History     Tobacco Use    Smoking status: Never    Smokeless tobacco: Never   Substance Use Topics    Alcohol use: Not Currently        Allergies/Medications:   Allergies:   No Known Allergies  Medications:  Medications Prior to Admission   Medication Sig Dispense Refill Last Dose    cephalexin 500 MG Oral Cap    2024    ASPIRIN OR Take 81 mg by mouth.   Past Week    progesterone 200 MG Oral Cap Take 1 capsule (200 mg total) by mouth nightly. TAKE AT BEDTIME 30 capsule 0 2024    prenatal vitamin with DHA 27-0.8-228 MG Oral Cap Take 1 capsule by mouth daily.   2024       Review of Systems:   As documented in HPI    Physical Exam:   Temp:  [98 °F (36.7 °C)] 98 °F (36.7 °C)  Pulse:  [] 100  Resp:  [18] 18  BP: (109-129)/(50-74) 129/50  SpO2:  [97 %] 97  %    Vitals:    24 0745 24 0800   BP: 128/74 129/50   BP Location: Left arm    Pulse: 104 100   Resp: 18    Temp: 98 °F (36.7 °C)    TempSrc: Oral    SpO2: 97% 97%       Estimated body mass index is 43.6 kg/m² as calculated from the following:    Height as of 24: 64\".    Weight as of 24: 254 lb (115.2 kg).     FHT: NA  [place] NA  SVE 4-5 cm dilated/100% effaced, only clot palpated at approx 0800    Constitutional: A&O, NAD, +facial hirsutism  Abdomen: soft, obese, +hirsutism   Extremities: non tender, no lower extremity edema     Results:     Component      Latest Ref [facility] 2024  7:57 AM   WBC      4.0 - 11.0 x10(3) uL 11.8 (H)    RBC      3.80 - 5.30 x10(6)uL 3.96    Hemoglobin      12.0 - 16.0 g/dL 12.7    Hematocrit      35.0 - 48.0 % 35.4    Platelet Count      150.0 - 450.0 10(3)uL 289.0    MCV      80.0 - 100.0 fL 89.4    MCH      26.0 - 34.0 pg 32.1    MCHC      31.0 - 37.0 g/dL 35.9    RDW      % 11.9    Prelim Neutrophil Abs      1.50 - 7.70 x10 (3) uL 8.37 (H)    Neutrophils Absolute      1.50 - 7.70 x10(3) uL 8.37 (H)    Lymphocytes Absolute      1.00 - 4.00 x10(3) uL 2.26    Monocytes Absolute      0.10 - 1.00 x10(3) uL 0.99    Eosinophils Absolute      0.00 - 0.70 x10(3) uL 0.06    Basophils Absolute      0.00 - 0.20 x10(3) uL 0.04    Immature Granulocyte Absolute      0.00 - 1.00 x10(3) uL 0.08    Neutrophils %      % 70.9    Lymphocytes %      % 19.2    Monocytes %      % 8.4    Eosinophils %      % 0.5    Basophils %      % 0.3    Immature Granulocyte %      % 0.7        Assessment/Plan:    Maral Young 28 year old  at 15w4d - Chronic placental abruption - premature dilation to 4 cm at 15w3d -> spontaneously delivered fetus at 15w4d on 2024 at home presents with retained placenta & vaginal bleeding.     SAB  -desires full testing on fetus and placenta including genetics, gross exam, autopsy, etc    Retained placenta  -bleeding currently not very active   -plan  ultrasound guided suction D&C. Risks, benefits discussed   -plan cefazolin preop prophylaxis   -patient declines epidural/spinal. Will discuss with anesthesiologist    Labs including coags still pending   SCDs  Anesthesiologist aware   To OR     Risks, benefits, alternatives and possible complications have been discussed in detail with the patient. All questions answered to the best of my ability.     Ellyn Tinoco MD    Note to patient and family:  The 21st Century Cures Act makes medical notes available to patients in the interest of transparency.  However, please be advised that this is a medical document.  It is intended as a peer to peer communication.  It is written in medical language and may contain abbreviations or verbiage that are technical and unfamiliar.  It may appear blunt or direct.  Medical documents are intended to carry relevant information, facts as evident, and the clinical opinion of the practitioner.

## 2024-04-05 NOTE — CONSULTS
[employer] HOSPITALIST  CONSULT     Maral Young Patient Status:  Inpatient    12/3/1995 MRN [id number]   Location [facility] LABOR & DELIVERY Attending Ellyn Tinoco MD   Hosp Day # 0 PCP None Pcp     Reason for consult: Pneumonia    Requested by: Dr. Tinoco    Subjective:   History of Present Illness:     Maral Young is a 28 year old female who is s/p D and C for retained placenta and vaginal bleeding after a spontaneous delivered fetus at 15w4d on .  She was noted on CXR to have bilateral upper lobe airspace opacities suspected to be aspiration pneumonia and hospitalist group was consulted for further management. In the OR she did have an episode of emesis and aspiration which prompted her CXR.  Currently she denies shortness of breath or cough    History/Other:    Past Medical History:  Past Medical History:   Diagnosis Date    Incomplete spontaneous  (HCC) 2024    Chronic placental abruption - premature dilation to 4 cm at 15w3d -> spontaneously delivered fetus at 15w4d on 2024. Retained placenta.     Language barrier 2024    Mauritanian  needed     labor (HCC)     S/P D&C (status post dilation and curettage) 2024    For retained placenta/incomplete SAB.      Past Surgical History:   Past Surgical History:   Procedure Laterality Date    APPENDECTOMY        Family History:   No family history on file.  Social History:    reports that she has never smoked. She has never used smokeless tobacco. She reports that she does not currently use alcohol. She reports that she does not use drugs.     Allergies: No Known Allergies    Medications:    No current facility-administered medications on file prior to encounter.     Current Outpatient Medications on File Prior to Encounter   Medication Sig Dispense Refill    cephalexin 500 MG Oral Cap       ASPIRIN OR Take 81 mg by mouth.      progesterone 200 MG Oral Cap Take 1 capsule (200 mg total) by  mouth nightly. TAKE AT BEDTIME 30 capsule 0    prenatal vitamin with DHA 27-0.8-228 MG Oral Cap Take 1 capsule by mouth daily.         Review of Systems:   A comprehensive review of systems was completed.    Pertinent positives and negatives noted in the HPI.    Objective:   Physical Exam:    /63   Pulse 110   Temp 98 °F (36.7 °C) (Oral)   Resp 16   Wt 253 lb 8.5 oz (115 kg)   LMP 12/10/2023 (Exact Date)   SpO2 96%   BMI 43.52 kg/m²   General: No acute distress, Alert  Respiratory: No rhonchi, no wheezes  Cardiovascular: S1, S2. Regular rate and rhythm  Abdomen: Soft, NT/ND, +BS  Neuro: No new focal deficits  Extremities: No edema      Results:    Labs:      Labs Last 24 Hours:  Recent Labs   Lab 04/05/24 0757 04/05/24 0805 04/05/24  1043   WBC 11.8*  --  9.2   HGB 12.7  --  10.3*   MCV 89.4  --  89.4   .0  --  226.0   INR  --  1.51* 1.18       Recent Labs   Lab 04/05/24 0757 04/05/24  1043   * 100*   BUN 5* 5*   CREATSERUM 0.79 0.77   CA 8.9 7.6*   ALB 3.1* 2.4*    137   K 3.6 3.3*    108   CO2 22.0 25.0   ALKPHO 89 71   AST 27 23   ALT 54 44   BILT 0.3 0.2   TP 7.0 5.5*       Recent Labs   Lab 04/05/24  0805 04/05/24  1043   PTP 17.9*  18.3* 15.1*   INR 1.51* 1.18       No results for input(s): \"TROP\", \"CK\" in the last 168 hours.      Imaging: Imaging data reviewed in Epic.    Assessment & Plan:      #Bilateral upper airspace opacities  -no cough or shortness of breath  -possible aspiration  -empiric antibiotics  -follow Cx results  -check Procal    # Hypokalemia  -replete    # Normocytic anemia      Plan of care discussed with patient and RN    Dorothy Torres MD  4/5/2024    The 21st Century Cures Act makes medical notes like these available to patients in the interest of transparency. Please be advised this is a medical document. Medical documents are intended to carry relevant information, facts as evident, and the clinical opinion of the practitioner. The medical note  is intended as peer to peer communication and may appear blunt or direct. It is written in medical language and may contain abbreviations or verbiage that are unfamiliar.

## 2024-04-06 VITALS
TEMPERATURE: 98 F | DIASTOLIC BLOOD PRESSURE: 53 MMHG | HEART RATE: 84 BPM | OXYGEN SATURATION: 96 % | BODY MASS INDEX: 44 KG/M2 | SYSTOLIC BLOOD PRESSURE: 125 MMHG | RESPIRATION RATE: 16 BRPM | WEIGHT: 253.5 LBS

## 2024-04-06 LAB
BASOPHILS # BLD AUTO: 0.05 X10(3) UL (ref 0–0.2)
BASOPHILS NFR BLD AUTO: 0.6 %
CMV IGG AB: <0.6 U/ML
CMV IGM AB: <30 AU/ML
EOSINOPHIL # BLD AUTO: 0.27 X10(3) UL (ref 0–0.7)
EOSINOPHIL NFR BLD AUTO: 3.1 %
ERYTHROCYTE [DISTWIDTH] IN BLOOD BY AUTOMATED COUNT: 12.9 %
HCT VFR BLD AUTO: 30.2 %
HGB BLD-MCNC: 10.6 G/DL
IMM GRANULOCYTES # BLD AUTO: 0.09 X10(3) UL (ref 0–1)
IMM GRANULOCYTES NFR BLD: 1 %
L PNEUMO AG UR QL: NEGATIVE
LYMPHOCYTES # BLD AUTO: 2.37 X10(3) UL (ref 1–4)
LYMPHOCYTES NFR BLD AUTO: 27.3 %
MCH RBC QN AUTO: 31.1 PG (ref 26–34)
MCHC RBC AUTO-ENTMCNC: 35.1 G/DL (ref 31–37)
MCV RBC AUTO: 88.6 FL
MONOCYTES # BLD AUTO: 0.64 X10(3) UL (ref 0.1–1)
MONOCYTES NFR BLD AUTO: 7.4 %
NEUTROPHILS # BLD AUTO: 5.27 X10 (3) UL (ref 1.5–7.7)
NEUTROPHILS # BLD AUTO: 5.27 X10(3) UL (ref 1.5–7.7)
NEUTROPHILS NFR BLD AUTO: 60.6 %
PLATELET # BLD AUTO: 191 10(3)UL (ref 150–450)
RBC # BLD AUTO: 3.41 X10(6)UL
STREP PNEUMO ANTIGEN, URINE: NEGATIVE
WBC # BLD AUTO: 8.7 X10(3) UL (ref 4–11)

## 2024-04-06 PROCEDURE — 99232 SBSQ HOSP IP/OBS MODERATE 35: CPT | Performed by: INTERNAL MEDICINE

## 2024-04-06 RX ORDER — IBUPROFEN 600 MG/1
600 TABLET ORAL EVERY 6 HOURS PRN
Qty: 30 TABLET | Refills: 0 | Status: SHIPPED | OUTPATIENT
Start: 2024-04-06

## 2024-04-06 RX ORDER — AMOXICILLIN AND CLAVULANATE POTASSIUM 875; 125 MG/1; MG/1
1 TABLET, FILM COATED ORAL 2 TIMES DAILY
Qty: 10 TABLET | Refills: 0 | Status: SHIPPED | OUTPATIENT
Start: 2024-04-06 | End: 2024-04-11

## 2024-04-06 NOTE — PROGRESS NOTES
OB attending    Hb 11.2. Cr 0.71. Coags ok.   Will check CBC in morning.     Component      Latest Ref Rng 4/5/2024  6:31 PM 4/5/2024  6:36 PM 4/5/2024  7:21 PM   WBC      4.0 - 11.0 x10(3) uL 14.3 (H)      RBC      3.80 - 5.30 x10(6)uL 3.58 (L)      Hemoglobin      12.0 - 16.0 g/dL 11.2 (L)      Hematocrit      35.0 - 48.0 % 31.8 (L)      Platelet Count      150.0 - 450.0 10(3)uL 226.0      Immature Platelet Fraction      0.0 - 7.0 % 1.7      MCV      80.0 - 100.0 fL 88.8      MCH      26.0 - 34.0 pg 31.3      MCHC      31.0 - 37.0 g/dL 35.2      RDW      % 12.6      Prelim Neutrophil Abs      1.50 - 7.70 x10 (3) uL 11.33 (H)      Neutrophils Absolute      1.50 - 7.70 x10(3) uL 11.33 (H)      Lymphocytes Absolute      1.00 - 4.00 x10(3) uL 1.90      Monocytes Absolute      0.10 - 1.00 x10(3) uL 0.89      Eosinophils Absolute      0.00 - 0.70 x10(3) uL 0.01      Basophils Absolute      0.00 - 0.20 x10(3) uL 0.04      Immature Granulocyte Absolute      0.00 - 1.00 x10(3) uL 0.12      Neutrophils %      % 79.3      Lymphocytes %      % 13.3      Monocytes %      % 6.2      Eosinophils %      % 0.1      Basophils %      % 0.3      Immature Granulocyte %      % 0.8      Glucose      70 - 99 mg/dL  92     Sodium      136 - 145 mmol/L  140     Potassium      3.5 - 5.1 mmol/L  3.7     Chloride      98 - 112 mmol/L  112     Carbon Dioxide, Total      21.0 - 32.0 mmol/L  20.0 (L)     ANION GAP      0 - 18 mmol/L  8     BUN      9 - 23 mg/dL  4 (L)     CREATININE      0.55 - 1.02 mg/dL  0.71     CALCIUM      8.5 - 10.1 mg/dL  8.0 (L)     CALCULATED OSMOLALITY      275 - 295 mOsm/kg  287     EGFR      >=60 mL/min/1.73m2  119     AST (SGOT)      15 - 37 U/L  26     ALT (SGPT)      13 - 56 U/L  40     ALKALINE PHOSPHATASE      37 - 98 U/L  75     Total Bilirubin      0.1 - 2.0 mg/dL  0.4     PROTEIN, TOTAL      6.4 - 8.2 g/dL  5.8 (L)     Albumin      3.4 - 5.0 g/dL  2.5 (L)     Globulin      2.8 - 4.4 g/dL  3.3     A/G Ratio       1.0 - 2.0   0.8 (L)     PT      11.6 - 14.8 seconds   14.3    INR      0.80 - 1.20    1.11    APTT      23.3 - 35.6 seconds   23.3    Fibrinogen      180 - 480 mg/dl   380      Ellyn Tinoco MD

## 2024-04-06 NOTE — PROGRESS NOTES
Discharged to home per ambulatory in stable condition with written and verbal instructions. Patient verbalizes understanding of information given. To follow up with OB in 6 weeks

## 2024-04-06 NOTE — DISCHARGE SUMMARY
[facility]  Discharge Summary    Maral Young Patient Status:  Inpatient    12/3/1995 MRN [id number]   Location [facility] LABOR & DELIVERY Attending Ellyn Tinoco MD   Hosp Day # 1 PCP None Pcp     Date of Admission: 2024    Date of Discharge: 2024     Admitting Diagnosis:   Fetal demise/SAB s/p  at home    Present on Admission:   Pregnancy ([facility])   Incomplete spontaneous  ([facility])   Prior poor obstetrical history in second trimester, antepartum ([facility])   Language barrier   Incomplete  ([facility])   Placental abruption in second trimester ([facility])       Discharge Diagnosis:   Same with liveborn infant born at 15w4d     Procedures: suction D&C for retained placenta      Hospital Course: Patient admitted for delivery of placenta after  at home of 15w4d fetal demise.   Her postpartum course was complicated by postpartum hemorrhage and concern for aspiration during procedure. She was treated accordingly with uterotonics, Shahnaz device, and antibiotics.   She was discharged home on postpartum day 1 in stable condition with plan to continue outpatient antibiotics.    Consults: Hospitalist     Complications: none    Disposition: Home  Postpartum instructions were given including rest for 24 hours, no heavy lifting or exercise for 24 hours, one week of pelvic rest, and analgesia with ibuprofen and/or tylenol.  Pt asked to call with any signs of complications including but not limited to fever, excessive pain, heavy bleeding, etc.    Follow up: in OB/GYN office. Return precautions reviewed.      Discharge Condition: Stable    Discharge Medications:   Current Discharge Medication List        START taking these medications    Details   amoxicillin clavulanate 875-125 MG Oral Tab Take 1 tablet by mouth 2 (two) times daily for 5 days.  Qty: 10 tablet, Refills: 0      ibuprofen 600 MG Oral Tab Take 1 tablet (600 mg total) by mouth every 6 (six) hours as needed for Fever (post-delivery pain).  Qty:  30 tablet, Refills: 0           CONTINUE these medications which have NOT CHANGED    Details   prenatal vitamin with DHA 27-0.8-228 MG Oral Cap Take 1 capsule by mouth daily.           STOP taking these medications       cephalexin 500 MG Oral Cap        ASPIRIN OR        progesterone 200 MG Oral Cap                Nicole Roca MD  [facility]  4/6/2024 12:46 PM

## 2024-04-06 NOTE — PROGRESS NOTES
[facility]   part of [facility]     Hospitalist Progress Note     Maral Young Patient Status:  Inpatient    12/3/1995 MRN [id number]   Location [facility] LABOR & DELIVERY Attending Trevor Florez,    Hosp Day # 1 PCP None Pcp     Chief Complaint: Aspiration    Subjective:     Patient denies fever, cough or SOB.     Objective:    Review of Systems:   A comprehensive review of systems was completed; pertinent positive and negatives stated in subjective.    Vital signs:  Temp:  [97.7 °F (36.5 °C)-98.7 °F (37.1 °C)] 97.8 °F (36.6 °C)  Pulse:  [] 81  Resp:  [12-26] 18  BP: ()/(41-74) 115/50  SpO2:  [93 %-100 %] 96 %    Physical Exam:    General: No acute distress, awake and alert  Respiratory: No wheezes, no rhonchi  Cardiovascular: S1, S2, regular rate and rhythm  Abdomen: Soft, Non-tender, non-distended, positive bowel sounds  Neuro: REZA x 4  Extremities: No edema    Diagnostic Data:    Labs:  Recent Labs   Lab 24  0757 24  0805 24  1043 04/05/24  1831 24  1921   WBC 11.8*  --  9.2 14.3*  --    HGB 12.7  --  10.3* 11.2*  --    MCV 89.4  --  89.4 88.8  --    .0  --  226.0 226.0  --    INR  --  1.51* 1.18  --  1.11     Recent Labs   Lab 24  07524  1043 04/05/24  183   * 100* 92   BUN 5* 5* 4*   CREATSERUM 0.79 0.77 0.71   CA 8.9 7.6* 8.0*   ALB 3.1* 2.4* 2.5*    137 140   K 3.6 3.3* 3.7    108 112   CO2 22.0 25.0 20.0*   ALKPHO 89 71 75   AST 27 23 26   ALT 54 44 40   BILT 0.3 0.2 0.4   TP 7.0 5.5* 5.8*     Estimated Creatinine Clearance: 102.8 mL/min (based on SCr of 0.71 mg/dL).    No results for input(s): \"TROP\", \"TROPHS\", \"CK\" in the last 168 hours.    Recent Labs   Lab 24  0805 24  1043 24  1921   PTP 17.9*  18.3* 15.1* 14.3   INR 1.51* 1.18 1.11     Lab Results   Component Value Date    TSH 1.490 2024     Microbiology  No results found for this visit on 24.    Imaging: Reviewed in  Epic.    Medications:    sodium chloride   Intravenous Once    azithromycin  500 mg Intravenous Q24H    ampicillin-sulbactam  3 g Intravenous q6h       Assessment & Plan:      #Bilateral upper airspace opacities suspect aspiration pneumonitis  -Did have an episode of emesis and aspiration in OR  -RVP negative, procalcitonin negative  -Strep/legionella urine antigens negative  -Can discharge on short course Augmentin x 5 days    #SAB with retained placenta s/p D&C 4/5  -Per Ob/gyn    #Leukocytosis due to above  #Normocytic anemia  -WBC now normal, hgb stable    Ok to discharge home from my standpoint. Discussed with patient, RN.      Trevor Florez DO    Supplementary Documentation:     Quality:  DVT Mechanical Prophylaxis:     Early ambuation  DVT Pharmacologic Prophylaxis   Medication   None   Code Status: Full  Vargas: Vargas catheter in place  ALYSSA: Today?    Discharge is dependent on: Clinical status, Ob/gyn recs  At this point Ms. Young is expected to be discharge to: Home    The 21st Century Cures Act makes medical notes like these available to patients in the interest of transparency. Please be advised this is a medical document. Medical documents are intended to carry relevant information, facts as evident, and the clinical opinion of the practitioner. The medical note is intended as peer to peer communication and may appear blunt or direct. It is written in medical language and may contain abbreviations or verbiage that are unfamiliar.

## 2024-04-07 LAB
BLOOD TYPE BARCODE: 600
BLOOD TYPE BARCODE: 6200
UNIT VOLUME: 168 ML
UNIT VOLUME: 350 ML

## 2024-04-08 ENCOUNTER — TELEPHONE (OUTPATIENT)
Facility: CLINIC | Age: 29
End: 2024-04-08

## 2024-04-08 LAB
B2 GLYCOPROT1 IGG SERPL IA-ACNC: 3.6 U/ML (ref ?–7)
B2 GLYCOPROT1 IGM SERPL IA-ACNC: <2.4 U/ML (ref ?–7)
BLOOD TYPE BARCODE: 6200
C TRACH DNA SPEC QL NAA+PROBE: NEGATIVE
CARDIOLIPIN IGG SERPL-ACNC: 1.6 GPL (ref ?–10)
CARDIOLIPIN IGM SERPL-ACNC: <0.9 MPL (ref ?–10)
DSDNA IGG SERPL IA-ACNC: 1.4 IU/ML
ENA AB SER QL IA: 0.2 UG/L
ENA AB SER QL IA: NEGATIVE
N GONORRHOEA DNA SPEC QL NAA+PROBE: NEGATIVE
PARVO B19 IGG: 0.2 INDEX
PARVO B19 IGM: 0.2 INDEX
UNIT VOLUME: 350 ML

## 2024-04-08 NOTE — TELEPHONE ENCOUNTER
----- Message from Nicole Kramer MD sent at 4/6/2024  1:26 PM CDT -----  Regarding: postpartum f/u after demise  Please schedule patient for postpartum follow up within 2-3 weeks (or when Dr. Tinoco thinks is appropriate) due to fetal demise.

## 2024-04-09 ENCOUNTER — TELEPHONE (OUTPATIENT)
Dept: OBGYN UNIT | Facility: HOSPITAL | Age: 29
End: 2024-04-09

## 2024-04-09 PROBLEM — R76.0 LUPUS ANTICOAGULANT POSITIVE: Status: ACTIVE | Noted: 2024-04-05

## 2024-04-09 LAB — BENZODIAZEPINES CLASS UR: NEGATIVE

## 2024-04-17 LAB
CELLS ANALYZED CHRPOC: 20
CELLS COUNTED CHRPOC: 20
CELLS KARYOTYPED CHRPOC: 2
GTG BAND RES ACHIEVED CHRPOC: 400

## 2024-04-25 ENCOUNTER — TELEPHONE (OUTPATIENT)
Dept: OBGYN UNIT | Facility: HOSPITAL | Age: 29
End: 2024-04-25

## 2024-05-02 ENCOUNTER — TELEPHONE (OUTPATIENT)
Facility: CLINIC | Age: 29
End: 2024-05-02

## 2024-05-02 NOTE — TELEPHONE ENCOUNTER
Called patient using an . She states she is not bleeding or passing tissue but is concerned about not being seen since her D&C. She had an appointment for tomorrow 5/3/24 that she cancelled because she has to  her child and rescheduled for 5/21/24. She would like to be seen sooner. Opening for tomorrow at 10 am available. Patient can make that time and states she will be here. Understanding verbalized.

## 2024-05-03 ENCOUNTER — OFFICE VISIT (OUTPATIENT)
Facility: CLINIC | Age: 29
End: 2024-05-03
Payer: COMMERCIAL

## 2024-05-03 VITALS
HEART RATE: 73 BPM | HEIGHT: 64 IN | SYSTOLIC BLOOD PRESSURE: 112 MMHG | WEIGHT: 244.19 LBS | DIASTOLIC BLOOD PRESSURE: 70 MMHG | BODY MASS INDEX: 41.69 KG/M2

## 2024-05-03 DIAGNOSIS — L73.9 FOLLICULITIS: Primary | ICD-10-CM

## 2024-05-03 NOTE — PROGRESS NOTES
Maral Young is a 28 year old female  Patient's last menstrual period was 12/10/2023 (exact date).   Chief Complaint   Patient presents with    Follow - Up     24 DILATION AND CURETTAGE  Pimple like rash on vulva noticed after surgery lasted two weeks... friend brought her a medication and rash is gone but would still like to get it checked out..      .Patient c/o pimple like lesions on vulva after D&c - resolved now. No bleeding    OBSTETRICS HISTORY:  OB History    Para Term  AB Living   4 2   2 2     SAB IAB Ectopic Multiple Live Births   1     0        # Outcome Date GA Lbr Wilbert/2nd Weight Sex Type Anes PTL Lv   4 AB 24 15w4d  2.1 oz (0.058 kg) F NORMAL SPONT None Y ND      Complications: Abruptio Placenta, Other - see comments   3  22 19w0d    Vag-Forceps   FD      Complications: Intraamniotic Infection, Other - see comments   2 SAB  6w0d          1  14 26w0d   M Vag-Spont   FD      Complications: Other - see comments      Obstetric Comments   In , she fell onto a hard pavement at 24 weeks.  She then had bleeding 5 hours later.  Then she was found to have her cervix fully deep dilated and had an abruption.  The baby suffered a  death at 5 days of age.       In  she had a miscarriage at 6 to 7 weeks.       [place]  [place] and moved back to the [facility] for the pregnancy.  Had a cerclage placed at 16 weeks on 2022..  She was told she had a previa.  On  that the war started.  On 2022 she went to [place] to travel to the  and came here.  On  [place]  She was determined to have premature rupture membranes and chorioamnionitis at that time.  She did not have vaginal bleeding prior to the short cervix being found.      2024 - Morbid obesity, irregular menses, hirsutism, poor OB history. During this pregnancy she has had spotting throughout.  She had spotting  during the fifth week and  and had an ultrasound.  She then had bleeding again and had an ultrasound on the seventh week pulm size clot.  She states she lost about 1 L of blood. Followed by MFM. She returned for a cervical length measurement at 15w3d and was noted to have advanced cervical dilatation (3 cm) with a bulging bag of water.  In addition anhydramnios is present.  It is unclear whether the patient might of had ruptured membranes or whether she has had a chronic placental abruption with subsequent decreased amniotic fluid volume. Patient then delivered fetus vaginally 24 at home. Underwent ultrasound guided suction D&C for retained placenta with excessive bleeding. Shahnaz device placed. INR 1.5. Transfused 2 units PRBC & 1 unit FFP while in recovery. During D&C she apparently aspirated. IV antibiotics. Karyotype on POC normal female. Placenta with acute infection and inflammation. The fetus did not have any apparent anomalies.        GYNE HISTORY:  Periods regular monthly    History   Sexual Activity    Sexual activity: Not Currently                 MEDICAL HISTORY:  Past Medical History:    Aspiration into airway    aspirated stomach contents/secretions into airway under MAC/sedation for suction D&C    Hirsutism    Incomplete spontaneous  (HCC)    Chronic placental abruption - premature dilation to 4 cm at 15w3d -> spontaneously delivered fetus at 15w4d on 2024. Retained placenta.     Irregular menses    Language barrier    Palest[place]  needed    Morbid obesity with BMI of 40.0-44.9, adult (HCC)    S/P D&C (status post dilation and curettage)    For retained placenta/incomplete SAB.        SURGICAL HISTORY:  Past Surgical History:   Procedure Laterality Date    Appendectomy      D & c  2024       SOCIAL HISTORY:  Social History     Socioeconomic History    Marital status:      Spouse name: Not on file    Number of children: Not on file    Years of education: Not on  file    Highest education level: Not on file   Occupational History    Not on file   Tobacco Use    Smoking status: Never    Smokeless tobacco: Never   Vaping Use    Vaping status: Never Used   Substance and Sexual Activity    Alcohol use: Not Currently    Drug use: Never    Sexual activity: Not Currently   Other Topics Concern    Not on file   Social History Narrative    Not on file     Social Determinants of Health     Financial Resource Strain: Low Risk  (4/5/2024)    Financial Resource Strain     Difficulty of Paying Living Expenses: Not hard at all     Med Affordability: No   Food Insecurity: No Food Insecurity (4/5/2024)    Food Insecurity     Food Insecurity: Never true   Transportation Needs: No Transportation Needs (4/5/2024)    Transportation Needs     Lack of Transportation: No   Stress: No Stress Concern Present (4/5/2024)    Stress     Feeling of Stress : No   Housing Stability: Low Risk  (4/5/2024)    Housing Stability     Housing Instability: No     Housing Instability Emergency: Not on file       FAMILY HISTORY:  No family history on file.    MEDICATIONS:    Current Outpatient Medications:     mupirocin 2 % External Ointment, Apply 1 Application topically 3 (three) times daily., Disp: 15 g, Rfl: 0    prenatal vitamin with DHA 27-0.8-228 MG Oral Cap, Take 1 capsule by mouth daily., Disp: , Rfl:     ibuprofen 600 MG Oral Tab, Take 1 tablet (600 mg total) by mouth every 6 (six) hours as needed for Fever (post-delivery pain). (Patient not taking: Reported on 5/3/2024), Disp: 30 tablet, Rfl: 0    ALLERGIES:  No Known Allergies      PHYSICAL EXAM:   Pelvic Exam:  External Genitalia: normal appearance, hair distribution, and no lesions  Urethral Meatus:  normal in size, location, without lesions and prolapse  Bladder:  No fullness, masses or tenderness  Vagina:  Normal appearance without lesions, no abnormal discharge  Cervix:  Normal without tenderness on motion  Uterus: normal in size, contour, position,  mobility, without tenderness  Adnexa: normal without masses or tenderness  Perineum: normal  Anus: no hemorroids     Assessment & Plan:  1. Folliculitis  - mupirocin ointment prn

## 2024-05-21 ENCOUNTER — TELEPHONE (OUTPATIENT)
Facility: CLINIC | Age: 29
End: 2024-05-21

## 2024-05-21 ENCOUNTER — POSTPARTUM (OUTPATIENT)
Facility: CLINIC | Age: 29
End: 2024-05-21

## 2024-05-21 VITALS
BODY MASS INDEX: 41.59 KG/M2 | SYSTOLIC BLOOD PRESSURE: 116 MMHG | HEART RATE: 92 BPM | DIASTOLIC BLOOD PRESSURE: 72 MMHG | WEIGHT: 243.63 LBS | HEIGHT: 64 IN

## 2024-05-21 DIAGNOSIS — N89.8 VAGINAL DISCHARGE: Primary | ICD-10-CM

## 2024-05-21 PROCEDURE — 81514 NFCT DS BV&VAGINITIS DNA ALG: CPT | Performed by: OBSTETRICS & GYNECOLOGY

## 2024-05-21 PROCEDURE — 99024 POSTOP FOLLOW-UP VISIT: CPT | Performed by: OBSTETRICS & GYNECOLOGY

## 2024-05-21 PROCEDURE — 3008F BODY MASS INDEX DOCD: CPT | Performed by: OBSTETRICS & GYNECOLOGY

## 2024-05-21 PROCEDURE — 3078F DIAST BP <80 MM HG: CPT | Performed by: OBSTETRICS & GYNECOLOGY

## 2024-05-21 PROCEDURE — 3074F SYST BP LT 130 MM HG: CPT | Performed by: OBSTETRICS & GYNECOLOGY

## 2024-05-21 NOTE — PROGRESS NOTES
Maral Young is a 28 year old female  Patient's last menstrual period was 12/10/2023 (exact date).   Chief Complaint   Patient presents with    Postpartum Care     Fetal demise 24  Pt feeling better, states no bleeding but has some ' pulling pain' lower abdominal since surgery  Along with some white discharge, started 3/4 days ago    .  Patient has h/o 3 2nd trimester pregnancy losses, will schedule hysteroscopy to evaluate uterine cavity  Patient c/o vaginal discharge for couple days  Patient has h/o irreguar menses. She took metformin for 3 months before getting pregnant, discussed PCOS, weight loss       OBSTETRICS HISTORY:  OB History    Para Term  AB Living   4 2   2 2     SAB IAB Ectopic Multiple Live Births   1     0        # Outcome Date GA Lbr Wilbert/2nd Weight Sex Type Anes PTL Lv   4 AB 24 15w4d  2.1 oz (0.058 kg) F NORMAL SPONT None Y ND      Complications: Abruptio Placenta, Other - see comments   3  22 19w0d    Vag-Forceps   FD      Complications: Intraamniotic Infection, Other - see comments   2 SAB  6w0d          1  14 26w0d   M Vag-Spont   FD      Complications: Other - see comments      Obstetric Comments   In , she fell onto a hard pavement at 24 weeks.  She then had bleeding 5 hours later.  Then she was found to have her cervix fully deep dilated and had an abruption.  The baby suffered a  death at 5 days of age.       In  she had a miscarriage at 6 to 7 weeks.       [place]  [place] and moved back to the [facility] for the pregnancy.  Had a cerclage placed at 16 weeks on 2022..  She was told she had a previa.  On  that the war started.  On 2022 she went to [place] and came here.  On  [place]  She was determined to have premature rupture membranes and chorioamnionitis at that time.  She did not have vaginal bleeding prior to the  short cervix being found.      2024 - Morbid obesity, irregular menses, hirsutism, poor OB history. During this pregnancy she has had spotting throughout.  She had spotting during the fifth week and  and had an ultrasound.  She then had bleeding again and had an ultrasound on the seventh week pulm size clot.  She states she lost about 1 L of blood. Followed by MFM. She returned for a cervical length measurement at 15w3d and was noted to have advanced cervical dilatation (3 cm) with a bulging bag of water.  In addition anhydramnios is present.  It is unclear whether the patient might of had ruptured membranes or whether she has had a chronic placental abruption with subsequent decreased amniotic fluid volume. Patient then delivered fetus vaginally 24 at home. Underwent ultrasound guided suction D&C for retained placenta with excessive bleeding. Shahnaz device placed. INR 1.5. Transfused 2 units PRBC & 1 unit FFP while in recovery. During D&C she apparently aspirated. IV antibiotics. Karyotype on POC normal female. Placenta with acute infection and inflammation. The fetus did not have any apparent anomalies.        GYNE HISTORY:  Periods absent    History   Sexual Activity    Sexual activity: Not Currently                 MEDICAL HISTORY:  Past Medical History:    Aspiration into airway    aspirated stomach contents/secretions into airway under MAC/sedation for suction D&C    Hirsutism    Incomplete spontaneous  (HCC)    Chronic placental abruption - premature dilation to 4 cm at 15w3d -> spontaneously delivered fetus at 15w4d on 2024. Retained placenta.     Irregular menses    Language barrier    [place]raini  needed    Morbid obesity with BMI of 40.0-44.9, adult (HCC)    S/P D&C (status post dilation and curettage)    For retained placenta/incomplete SAB.        SURGICAL HISTORY:  Past Surgical History:   Procedure Laterality Date    Appendectomy      D & c  2024       SOCIAL  HISTORY:  Social History     Socioeconomic History    Marital status:      Spouse name: Not on file    Number of children: Not on file    Years of education: Not on file    Highest education level: Not on file   Occupational History    Not on file   Tobacco Use    Smoking status: Never    Smokeless tobacco: Never   Vaping Use    Vaping status: Never Used   Substance and Sexual Activity    Alcohol use: Not Currently    Drug use: Never    Sexual activity: Not Currently   Other Topics Concern    Not on file   Social History Narrative    Not on file     Social Determinants of Health     Financial Resource Strain: Low Risk  (4/5/2024)    Financial Resource Strain     Difficulty of Paying Living Expenses: Not hard at all     Med Affordability: No   Food Insecurity: No Food Insecurity (4/5/2024)    Food Insecurity     Food Insecurity: Never true   Transportation Needs: No Transportation Needs (4/5/2024)    Transportation Needs     Lack of Transportation: No   Stress: No Stress Concern Present (4/5/2024)    Stress     Feeling of Stress : No   Housing Stability: Low Risk  (4/5/2024)    Housing Stability     Housing Instability: No     Housing Instability Emergency: Not on file     Crib or Bassinette: Not on file       FAMILY HISTORY:  No family history on file.    MEDICATIONS:    Current Outpatient Medications:     prenatal vitamin with DHA 27-0.8-228 MG Oral Cap, Take 1 capsule by mouth daily., Disp: , Rfl:     mupirocin 2 % External Ointment, Apply 1 Application topically 3 (three) times daily. (Patient not taking: Reported on 5/21/2024), Disp: 15 g, Rfl: 0    ibuprofen 600 MG Oral Tab, Take 1 tablet (600 mg total) by mouth every 6 (six) hours as needed for Fever (post-delivery pain). (Patient not taking: Reported on 5/3/2024), Disp: 30 tablet, Rfl: 0    ALLERGIES:  No Known Allergies      PHYSICAL EXAM:   Pelvic Exam:  External Genitalia: normal appearance, hair distribution, and no lesions  Urethral Meatus:   normal in size, location, without lesions and prolapse  Bladder:  No fullness, masses or tenderness  Vagina:  Normal appearance without lesions, no abnormal discharge  Cervix:  Normal without tenderness on motion  Uterus: normal in size, contour, position, mobility, without tenderness  Adnexa: normal without masses or tenderness  Perineum: normal  Anus: no hemorroids     Assessment & Plan:  1. Vaginal discharge    - Vaginitis Vaginosis PCR Panel

## 2024-06-20 ENCOUNTER — LABORATORY ENCOUNTER (OUTPATIENT)
Dept: LAB | Facility: HOSPITAL | Age: 29
End: 2024-06-20
Attending: OBSTETRICS & GYNECOLOGY

## 2024-06-20 ENCOUNTER — APPOINTMENT (OUTPATIENT)
Dept: LAB | Facility: HOSPITAL | Age: 29
End: 2024-06-20
Attending: OBSTETRICS & GYNECOLOGY

## 2024-06-20 DIAGNOSIS — O26.20 RECURRENT PREGNANCY LOSS, ANTEPARTUM CONDITION OR COMPLICATION (HCC): ICD-10-CM

## 2024-06-20 DIAGNOSIS — Z98.890 S/P D&C (STATUS POST DILATION AND CURETTAGE): ICD-10-CM

## 2024-06-20 LAB
APTT PPP: 29.9 SECONDS (ref 23–36)
ERYTHROCYTE [DISTWIDTH] IN BLOOD BY AUTOMATED COUNT: 12.9 %
HCT VFR BLD AUTO: 43.3 %
HGB BLD-MCNC: 15 G/DL
INR BLD: 1.03 (ref 0.8–1.2)
MCH RBC QN AUTO: 30.3 PG (ref 26–34)
MCHC RBC AUTO-ENTMCNC: 34.6 G/DL (ref 31–37)
MCV RBC AUTO: 87.5 FL
PLATELET # BLD AUTO: 341 10(3)UL (ref 150–450)
PROTHROMBIN TIME: 13.5 SECONDS (ref 11.6–14.8)
RBC # BLD AUTO: 4.95 X10(6)UL
WBC # BLD AUTO: 10.6 X10(3) UL (ref 4–11)

## 2024-06-20 PROCEDURE — 85730 THROMBOPLASTIN TIME PARTIAL: CPT

## 2024-06-20 PROCEDURE — 85610 PROTHROMBIN TIME: CPT

## 2024-06-20 PROCEDURE — 36415 COLL VENOUS BLD VENIPUNCTURE: CPT

## 2024-06-20 PROCEDURE — 85027 COMPLETE CBC AUTOMATED: CPT

## 2024-06-24 ENCOUNTER — ANESTHESIA (OUTPATIENT)
Dept: SURGERY | Facility: HOSPITAL | Age: 29
End: 2024-06-24

## 2024-06-24 ENCOUNTER — HOSPITAL ENCOUNTER (OUTPATIENT)
Facility: HOSPITAL | Age: 29
Setting detail: HOSPITAL OUTPATIENT SURGERY
Discharge: HOME | End: 2024-06-24
Attending: OBSTETRICS & GYNECOLOGY | Admitting: OBSTETRICS & GYNECOLOGY

## 2024-06-24 ENCOUNTER — ANESTHESIA EVENT (OUTPATIENT)
Dept: SURGERY | Facility: HOSPITAL | Age: 29
End: 2024-06-24

## 2024-06-24 VITALS
HEIGHT: 64 IN | TEMPERATURE: 98 F | RESPIRATION RATE: 20 BRPM | SYSTOLIC BLOOD PRESSURE: 113 MMHG | OXYGEN SATURATION: 95 % | DIASTOLIC BLOOD PRESSURE: 88 MMHG | BODY MASS INDEX: 41.32 KG/M2 | WEIGHT: 242 LBS | HEART RATE: 62 BPM

## 2024-06-24 DIAGNOSIS — O26.20 RECURRENT PREGNANCY LOSS, ANTEPARTUM CONDITION OR COMPLICATION (HCC): Primary | ICD-10-CM

## 2024-06-24 DIAGNOSIS — Z98.890 S/P D&C (STATUS POST DILATION AND CURETTAGE): ICD-10-CM

## 2024-06-24 PROBLEM — Z34.90 PREGNANCY (HCC): Status: RESOLVED | Noted: 2024-04-05 | Resolved: 2024-06-24

## 2024-06-24 LAB — B-HCG UR QL: NEGATIVE

## 2024-06-24 PROCEDURE — 58555 HYSTEROSCOPY DX SEP PROC: CPT | Performed by: OBSTETRICS & GYNECOLOGY

## 2024-06-24 PROCEDURE — 0UJD8ZZ INSPECTION OF UTERUS AND CERVIX, VIA NATURAL OR ARTIFICIAL OPENING ENDOSCOPIC: ICD-10-PCS | Performed by: OBSTETRICS & GYNECOLOGY

## 2024-06-24 RX ORDER — MEPERIDINE HYDROCHLORIDE 25 MG/ML
12.5 INJECTION INTRAMUSCULAR; INTRAVENOUS; SUBCUTANEOUS AS NEEDED
Status: DISCONTINUED | OUTPATIENT
Start: 2024-06-24 | End: 2024-06-24

## 2024-06-24 RX ORDER — SODIUM CHLORIDE, SODIUM LACTATE, POTASSIUM CHLORIDE, CALCIUM CHLORIDE 600; 310; 30; 20 MG/100ML; MG/100ML; MG/100ML; MG/100ML
INJECTION, SOLUTION INTRAVENOUS CONTINUOUS
Status: DISCONTINUED | OUTPATIENT
Start: 2024-06-24 | End: 2024-06-24

## 2024-06-24 RX ORDER — ACETAMINOPHEN 325 MG/1
650 TABLET ORAL ONCE
Status: DISCONTINUED | OUTPATIENT
Start: 2024-06-24 | End: 2024-06-24

## 2024-06-24 RX ORDER — HYDROMORPHONE HYDROCHLORIDE 1 MG/ML
0.2 INJECTION, SOLUTION INTRAMUSCULAR; INTRAVENOUS; SUBCUTANEOUS EVERY 5 MIN PRN
Status: DISCONTINUED | OUTPATIENT
Start: 2024-06-24 | End: 2024-06-24

## 2024-06-24 RX ORDER — NEOSTIGMINE METHYLSULFATE 1 MG/ML
INJECTION, SOLUTION INTRAVENOUS AS NEEDED
Status: DISCONTINUED | OUTPATIENT
Start: 2024-06-24 | End: 2024-06-24 | Stop reason: SURG

## 2024-06-24 RX ORDER — PROCHLORPERAZINE EDISYLATE 5 MG/ML
5 INJECTION INTRAMUSCULAR; INTRAVENOUS EVERY 8 HOURS PRN
Status: DISCONTINUED | OUTPATIENT
Start: 2024-06-24 | End: 2024-06-24

## 2024-06-24 RX ORDER — KETOROLAC TROMETHAMINE 30 MG/ML
INJECTION, SOLUTION INTRAMUSCULAR; INTRAVENOUS AS NEEDED
Status: DISCONTINUED | OUTPATIENT
Start: 2024-06-24 | End: 2024-06-24 | Stop reason: SURG

## 2024-06-24 RX ORDER — ACETAMINOPHEN 500 MG
1000 TABLET ORAL ONCE AS NEEDED
Status: DISCONTINUED | OUTPATIENT
Start: 2024-06-24 | End: 2024-06-24

## 2024-06-24 RX ORDER — SCOLOPAMINE TRANSDERMAL SYSTEM 1 MG/1
1 PATCH, EXTENDED RELEASE TRANSDERMAL ONCE
Status: DISCONTINUED | OUTPATIENT
Start: 2024-06-24 | End: 2024-06-24 | Stop reason: HOSPADM

## 2024-06-24 RX ORDER — METOCLOPRAMIDE HYDROCHLORIDE 5 MG/ML
INJECTION INTRAMUSCULAR; INTRAVENOUS AS NEEDED
Status: DISCONTINUED | OUTPATIENT
Start: 2024-06-24 | End: 2024-06-24 | Stop reason: SURG

## 2024-06-24 RX ORDER — LIDOCAINE HYDROCHLORIDE 10 MG/ML
INJECTION, SOLUTION EPIDURAL; INFILTRATION; INTRACAUDAL; PERINEURAL AS NEEDED
Status: DISCONTINUED | OUTPATIENT
Start: 2024-06-24 | End: 2024-06-24 | Stop reason: SURG

## 2024-06-24 RX ORDER — LABETALOL HYDROCHLORIDE 5 MG/ML
5 INJECTION, SOLUTION INTRAVENOUS EVERY 5 MIN PRN
Status: DISCONTINUED | OUTPATIENT
Start: 2024-06-24 | End: 2024-06-24

## 2024-06-24 RX ORDER — NALOXONE HYDROCHLORIDE 0.4 MG/ML
80 INJECTION, SOLUTION INTRAMUSCULAR; INTRAVENOUS; SUBCUTANEOUS AS NEEDED
Status: DISCONTINUED | OUTPATIENT
Start: 2024-06-24 | End: 2024-06-24

## 2024-06-24 RX ORDER — MIDAZOLAM HYDROCHLORIDE 1 MG/ML
INJECTION INTRAMUSCULAR; INTRAVENOUS AS NEEDED
Status: DISCONTINUED | OUTPATIENT
Start: 2024-06-24 | End: 2024-06-24 | Stop reason: SURG

## 2024-06-24 RX ORDER — HYDROCODONE BITARTRATE AND ACETAMINOPHEN 5; 325 MG/1; MG/1
2 TABLET ORAL ONCE AS NEEDED
Status: DISCONTINUED | OUTPATIENT
Start: 2024-06-24 | End: 2024-06-24

## 2024-06-24 RX ORDER — HYDROMORPHONE HYDROCHLORIDE 1 MG/ML
0.6 INJECTION, SOLUTION INTRAMUSCULAR; INTRAVENOUS; SUBCUTANEOUS EVERY 5 MIN PRN
Status: DISCONTINUED | OUTPATIENT
Start: 2024-06-24 | End: 2024-06-24

## 2024-06-24 RX ORDER — ONDANSETRON 2 MG/ML
INJECTION INTRAMUSCULAR; INTRAVENOUS AS NEEDED
Status: DISCONTINUED | OUTPATIENT
Start: 2024-06-24 | End: 2024-06-24 | Stop reason: SURG

## 2024-06-24 RX ORDER — ONDANSETRON 2 MG/ML
4 INJECTION INTRAMUSCULAR; INTRAVENOUS EVERY 6 HOURS PRN
Status: DISCONTINUED | OUTPATIENT
Start: 2024-06-24 | End: 2024-06-24

## 2024-06-24 RX ORDER — DIPHENHYDRAMINE HYDROCHLORIDE 50 MG/ML
12.5 INJECTION INTRAMUSCULAR; INTRAVENOUS AS NEEDED
Status: DISCONTINUED | OUTPATIENT
Start: 2024-06-24 | End: 2024-06-24

## 2024-06-24 RX ORDER — GLYCOPYRROLATE 0.2 MG/ML
INJECTION, SOLUTION INTRAMUSCULAR; INTRAVENOUS AS NEEDED
Status: DISCONTINUED | OUTPATIENT
Start: 2024-06-24 | End: 2024-06-24 | Stop reason: SURG

## 2024-06-24 RX ORDER — HYDROCODONE BITARTRATE AND ACETAMINOPHEN 5; 325 MG/1; MG/1
1 TABLET ORAL ONCE AS NEEDED
Status: DISCONTINUED | OUTPATIENT
Start: 2024-06-24 | End: 2024-06-24

## 2024-06-24 RX ORDER — HYDROMORPHONE HYDROCHLORIDE 1 MG/ML
0.4 INJECTION, SOLUTION INTRAMUSCULAR; INTRAVENOUS; SUBCUTANEOUS EVERY 5 MIN PRN
Status: DISCONTINUED | OUTPATIENT
Start: 2024-06-24 | End: 2024-06-24

## 2024-06-24 RX ORDER — ACETAMINOPHEN 500 MG
1000 TABLET ORAL ONCE
Status: DISCONTINUED | OUTPATIENT
Start: 2024-06-24 | End: 2024-06-24 | Stop reason: HOSPADM

## 2024-06-24 RX ORDER — ROCURONIUM BROMIDE 10 MG/ML
INJECTION, SOLUTION INTRAVENOUS AS NEEDED
Status: DISCONTINUED | OUTPATIENT
Start: 2024-06-24 | End: 2024-06-24 | Stop reason: SURG

## 2024-06-24 RX ADMIN — GLYCOPYRROLATE 0.7 MG: 0.2 INJECTION, SOLUTION INTRAMUSCULAR; INTRAVENOUS at 12:24:00

## 2024-06-24 RX ADMIN — ONDANSETRON 4 MG: 2 INJECTION INTRAMUSCULAR; INTRAVENOUS at 11:51:00

## 2024-06-24 RX ADMIN — NEOSTIGMINE METHYLSULFATE 4 MG: 1 INJECTION, SOLUTION INTRAVENOUS at 12:24:00

## 2024-06-24 RX ADMIN — KETOROLAC TROMETHAMINE 30 MG: 30 INJECTION, SOLUTION INTRAMUSCULAR; INTRAVENOUS at 12:22:00

## 2024-06-24 RX ADMIN — ROCURONIUM BROMIDE 10 MG: 10 INJECTION, SOLUTION INTRAVENOUS at 11:56:00

## 2024-06-24 RX ADMIN — LIDOCAINE HYDROCHLORIDE 100 MG: 10 INJECTION, SOLUTION EPIDURAL; INFILTRATION; INTRACAUDAL; PERINEURAL at 11:56:00

## 2024-06-24 RX ADMIN — SODIUM CHLORIDE, SODIUM LACTATE, POTASSIUM CHLORIDE, CALCIUM CHLORIDE: 600; 310; 30; 20 INJECTION, SOLUTION INTRAVENOUS at 11:50:00

## 2024-06-24 RX ADMIN — MIDAZOLAM HYDROCHLORIDE 2 MG: 1 INJECTION INTRAMUSCULAR; INTRAVENOUS at 11:50:00

## 2024-06-24 RX ADMIN — METOCLOPRAMIDE HYDROCHLORIDE 20 MG: 5 INJECTION INTRAMUSCULAR; INTRAVENOUS at 11:51:00

## 2024-06-24 NOTE — H&P
Maral Young is a 28 year old female  Patient's last menstrual period was 2024 (exact date). No chief complaint on file.  .Patient with h/o 3 2nd trimester pregnancy losses, scheduled hysteroscopy to evaluate endometrial cavity     OBSTETRICS HISTORY:  OB History    Para Term  AB Living   4 2   2 2     SAB IAB Ectopic Multiple Live Births   1     0        # Outcome Date GA Lbr Wilbert/2nd Weight Sex Type Anes PTL Lv   4 AB 24 15w4d  2.1 oz (0.058 kg) F NORMAL SPONT None Y ND      Complications: Abruptio Placenta, Other - see comments   3  22 19w0d    Vag-Forceps   FD      Complications: Intraamniotic Infection, Other - see comments   2 SAB  6w0d          1  14 26w0d   M Vag-Spont   FD      Complications: Other - see comments      Obstetric Comments   In , she fell onto a hard pavement at 24 weeks.  She then had bleeding 5 hours later.  Then she was found to have her cervix fully deep dilated and had an abruption.  The baby suffered a  death at 5 days of age.       In  she had a miscarriage at 6 to 7 weeks.       In  she was living in the  and moved back to the [facility] for the pregnancy.  Had a cerclage placed at 16 weeks on 2022..  She was told she had a previa.  On  that the war started.  On 2022 she went to [place] to travel to the  and came here.  On  [place]  She was determined to have premature rupture membranes and chorioamnionitis at that time.  She did not have vaginal bleeding prior to the short cervix being found.      2024 - Morbid obesity, irregular menses, hirsutism, poor OB history. During this pregnancy she has had spotting throughout.  She had spotting during the fifth week and  and had an ultrasound.  She then had bleeding again and had an ultrasound on the seventh week pulm size clot.  She states she lost about 1 L of blood. Followed by [facility]. She returned  for a cervical length measurement at 15w3d and was noted to have advanced cervical dilatation (3 cm) with a bulging bag of water.  In addition anhydramnios is present.  It is unclear whether the patient might of had ruptured membranes or whether she has had a chronic placental abruption with subsequent decreased amniotic fluid volume. Patient then delivered fetus vaginally 24 at home. Underwent ultrasound guided suction D&C for retained placenta with excessive bleeding. Shahnaz device placed. INR 1.5. Transfused 2 units PRBC & 1 unit FFP while in recovery. During D&C she apparently aspirated. IV antibiotics. Karyotype on POC normal female. Placenta with acute infection and inflammation. The fetus did not have any apparent anomalies.        GYNE HISTORY:  Periods regular monthly    History   Sexual Activity    Sexual activity: Not Currently                 MEDICAL HISTORY:  Past Medical History:    Aspiration into airway    aspirated stomach contents/secretions into airway under MAC/sedation for suction D&C    Hirsutism    History of blood transfusion    Incomplete spontaneous  (HCC)    Chronic placental abruption - premature dilation to 4 cm at 15w3d -> spontaneously delivered fetus at 15w4d on 2024. Retained placenta.     Irregular menses    Language barrier    Chadian  needed    Morbid obesity with BMI of 40.0-44.9, adult (HCC)       SURGICAL HISTORY:  Past Surgical History:   Procedure Laterality Date    Appendectomy      D & c  2024       SOCIAL HISTORY:  Social History     Socioeconomic History    Marital status:      Spouse name: Not on file    Number of children: Not on file    Years of education: Not on file    Highest education level: Not on file   Occupational History    Not on file   Tobacco Use    Smoking status: Never    Smokeless tobacco: Never   Vaping Use    Vaping status: Never Used   Substance and Sexual Activity    Alcohol use: Not Currently    Drug use:  Never    Sexual activity: Not Currently   Other Topics Concern    Not on file   Social History Narrative    Not on file     Social Determinants of Health     Financial Resource Strain: Low Risk  (4/5/2024)    Financial Resource Strain     Difficulty of Paying Living Expenses: Not hard at all     Med Affordability: No   Food Insecurity: No Food Insecurity (4/5/2024)    Food Insecurity     Food Insecurity: Never true   Transportation Needs: No Transportation Needs (4/5/2024)    Transportation Needs     Lack of Transportation: No   Stress: No Stress Concern Present (4/5/2024)    Stress     Feeling of Stress : No   Housing Stability: Low Risk  (4/5/2024)    Housing Stability     Housing Instability: No     Housing Instability Emergency: Not on file     Crib or Bassinette: Not on file       FAMILY HISTORY:  History reviewed. No pertinent family history.    MEDICATIONS:  No current outpatient medications on file.    ALLERGIES:  No Known Allergies      Review of Systems:  Constitutional:  Denies fatigue, night sweats, hot flashes  Eyes:  denies blurred or double vision  Cardiovascular:  denies chest pain or palpitations  Respiratory:  denies shortness of breath  Gastrointestinal:  denies heartburn, abdominal pain, diarrhea or constipation  Genitourinary:  denies dysuria, incontinence, abnormal vaginal discharge, vaginal itching  Musculoskeletal:  denies back pain.  Skin/Breast:  Denies any breast pain, lumps, or discharge.   Neurological:  denies headaches, extremity weakness or numbness.  Psychiatric: denies depression or anxiety.  Endocrine:   denies excessive thirst or urination.  Heme/Lymph:  denies history of anemia, easy bruising or bleeding.      PHYSICAL EXAM:   Constitutional: well developed, well nourished  Head/Face: normocephalic  Neck/Thyroid: thyroid symmetric, no thyromegaly, no nodules, no adenopathy  Lymphatic:no abnormal supraclavicular or axillary adenopathy is noted  Breast: normal without palpable  masses, tenderness, asymmetry, nipple discharge, nipple retraction or skin changes  Abdomen:  soft, nontender, nondistended, no masses  Skin/Hair: no unusual rashes or bruises  Extremities: no edema, no cyanosis  Psychiatric:  Oriented to time, place, person and situation. Appropriate mood and affect    Pelvic Exam:  External Genitalia: normal appearance, hair distribution, and no lesions  Urethral Meatus:  normal in size, location, without lesions and prolapse  Bladder:  No fullness, masses or tenderness  Vagina:  Normal appearance without lesions, no abnormal discharge  Cervix:  Normal without tenderness on motion  Uterus: normal in size, contour, position, mobility, without tenderness  Adnexa: normal without masses or tenderness  Perineum: normal  Anus: no hemorroids     Assessment & Plan:  Diagnoses and all orders for this visit:    Recurrent pregnancy loss, antepartum condition or complication (HCC)  -  diagnostic hysteroscopy

## 2024-06-24 NOTE — ANESTHESIA PREPROCEDURE EVALUATION
PRE-OP EVALUATION    Patient Name: Maral Young    Admit Diagnosis: Vaginal discharge [N89.8]    Pre-op Diagnosis: Vaginal discharge [N89.8]    HYSTEROSCOPY DIAGNOSTIC    Anesthesia Procedure: HYSTEROSCOPY DIAGNOSTIC    Surgeons and Role:     * Carolee Hodge, DO - Primary    Pre-op vitals reviewed.  Temp: 98 °F (36.7 °C)  Pulse: 62  Resp: 16  BP: 133/59  SpO2: 100 %  Body mass index is 41.54 kg/m².    Current medications reviewed.  Hospital Medications:   [Transfer Hold] acetaminophen (Tylenol Extra Strength) tab 1,000 mg  1,000 mg Oral Once    [Transfer Hold] scopolamine (Transderm-Scop) 1 MG/3DAYS patch 1 patch  1 patch Transdermal Once    lactated ringers infusion   Intravenous Continuous       Outpatient Medications:     No medications prior to admission.       Allergies: Patient has no known allergies.      Anesthesia Evaluation    Patient summary reviewed.    Anesthetic Complications  (+) history of anesthetic complications (aspiration)         GI/Hepatic/Renal    Negative GI/hepatic/renal ROS.                             Cardiovascular        Exercise tolerance: good     MET: >4    (+) obesity                                       Endo/Other    Negative endo/other ROS.  (-) diabetes                            Pulmonary    Negative pulmonary ROS.           (-) shortness of breath  (-) recent URI          Neuro/Psych    Negative neuro/psych ROS.                                  Past Surgical History:   Procedure Laterality Date    Appendectomy  2004    D & c  04/05/2024     Social History     Socioeconomic History    Marital status:    Tobacco Use    Smoking status: Never    Smokeless tobacco: Never   Vaping Use    Vaping status: Never Used   Substance and Sexual Activity    Alcohol use: Not Currently    Drug use: Never    Sexual activity: Not Currently     History   Drug Use Unknown     Available pre-op labs reviewed.  Lab Results   Component Value Date    WBC 10.6 06/20/2024    RBC 4.95  06/20/2024    HGB 15.0 06/20/2024    HCT 43.3 06/20/2024    MCV 87.5 06/20/2024    MCH 30.3 06/20/2024    MCHC 34.6 06/20/2024    RDW 12.9 06/20/2024    .0 06/20/2024     Lab Results   Component Value Date     04/05/2024    K 3.7 04/05/2024     04/05/2024    CO2 20.0 (L) 04/05/2024    BUN 4 (L) 04/05/2024    CREATSERUM 0.71 04/05/2024    GLU 92 04/05/2024    CA 8.0 (L) 04/05/2024     Lab Results   Component Value Date    INR 1.03 06/20/2024         Airway      Mallampati: II  Mouth opening: 3 FB  TM distance: 4 - 6 cm  Neck ROM: full Cardiovascular    Cardiovascular exam normal.  Rhythm: regular  Rate: normal  (-) murmur   Dental    Dentition appears grossly intact         Pulmonary    Pulmonary exam normal.  Breath sounds clear to auscultation bilaterally.        (-) wheezes       Other findings              ASA: 2   Plan: general  NPO status verified and patient meets guidelines.          Plan/risks discussed with: patient and spouse            We discussed GA w/ETT and possible scratchy throat and rarely dental damage.  We discussed analgesic plan and PONV prophylaxis.  The patient's questions were answered and consent was attained.

## 2024-06-24 NOTE — DISCHARGE INSTRUCTIONS
Home Care Instructions      1. Take it easy for the first 24 hours. Stay at home if possible and make sure someone is at home to help you or to report any symptoms or problems that you might have.    2. You may feel weak, dizzy, or a little lightheaded from the anesthesia during the first 24 hours. If so, stay in bed and rest and do not climb up and down the stairs. If you cannot completely avoid stairs, make sure that you have assistance.    3. Do not drive for 24 hours after surgery.    4. You may have spotting or discharge for the next few days. However, any heavy bleeding is abnormal and should be reported immediately.    5. Take you temperature once a day for the next 7 days and report to us if your temperature is up to 101 degrees or more in the absence of any common cold symptoms.    6. You may have some cramping, lower abdominal pains or shoulder pain for the first 24 hours. Usually two aspirin, Advil Ibuprofen or Tylenol every 4-6 hours takes care of this problem. If the pain persists or gets worse, notify the doctor immediately.  You may take ibuprofen after 6:30pm.    7. Avoid douching or intercourse for ten days. You may take a shower but do not take a bath for 10 days.    8. Please call in advance and make your appointment for a post-operative checkup at the office in 3-4 week.    9. Please do not hesitate to call if you have any problems or questions        [phone]  
Statement Selected

## 2024-06-24 NOTE — ANESTHESIA PROCEDURE NOTES
Airway  Date/Time: 6/24/2024 11:56 AM  Urgency: elective    Airway not difficult    General Information and Staff    Patient location during procedure: OR  Anesthesiologist: Kev Hendricks MD  Performed: anesthesiologist   Performed by: Kev Hendricks MD  Authorized by: Kev Hendricks MD      Indications and Patient Condition  Indications for airway management: anesthesia  Spontaneous Ventilation: absent  Sedation level: deep  Preoxygenated: yes  Patient position: sniffing  Mask difficulty assessment: 0 - not attempted    Final Airway Details  Final airway type: endotracheal airway      Successful airway: ETT  Cuffed: yes   Successful intubation technique: Video laryngoscopy  Facilitating devices/methods: intubating stylet and cricoid pressure  Endotracheal tube insertion site: oral  Blade: GlideScope  Blade size: #3  ETT size (mm): 7.0    Cormack-Lehane Classification: grade I - full view of glottis  Placement verified by: capnometry   Cuff volume (mL): 8  Measured from: lips  ETT to lips (cm): 22  Number of attempts at approach: 1  Number of other approaches attempted: 0

## 2024-06-24 NOTE — ANESTHESIA POSTPROCEDURE EVALUATION
[facility]    Maral Young Patient Status:  Hospital Outpatient Surgery   Age/Gender 28 year old female MRN [id number]   Location [facility] SURGERY Attending Carolee Hodge DO   Hosp Day # 0 PCP None Pcp       Anesthesia Post-op Note    HYSTEROSCOPY DIAGNOSTIC    Procedure Summary       Date: 06/24/24 Room / Location:  MAIN OR [address] [place] [address] MAIN OR    Anesthesia Start: 1150 Anesthesia Stop: 1232    Procedure: HYSTEROSCOPY DIAGNOSTIC (Vagina ) Diagnosis:       Vaginal discharge      (Vaginal discharge [N89.8])    Surgeons: Carolee Hodge DO Responsible Provider: Kev Hendricks MD    Anesthesia Type: general ASA Status: 2            Anesthesia Type: general    Vitals Value Taken Time   /67 06/24/24 1248   Temp 97.1 °F (36.2 °C) 06/24/24 1233   Pulse 63 06/24/24 1250   Resp 22 06/24/24 1250   SpO2 98 % 06/24/24 1250   Vitals shown include unfiled device data.    Patient Location: PACU    Anesthesia Type: general    Airway Patency: extubated and patent    Postop Pain Control: adequate    Mental Status: preanesthetic baseline    Nausea/Vomiting: none    Cardiopulmonary/Hydration status: stable euvolemic    Complications: no apparent anesthesia related complications    Postop vital signs: stable    Dental Exam: Unchanged from Preop    Patient to be discharged from PACU when criteria met.

## 2024-06-24 NOTE — OPERATIVE REPORT
Pre-op Dx:recurrent pregnancy losses  Post-op Dx: same  Surgeon:   Procedure: Hysteroscopy   Complications: none  Findings: normal anatomy  Procedure note: Pt was taken to the O.R. Where anesthesia was obtained without difficulty. She was then prepped and draped in normal sterile fashion in dorsal lithotomy position. Weighted speculum was placed in patients vagina and single tooth tenaculum applied to anterior lip of the cervix. Cervix was then gently dilated, uterus sounded to be 7 cm, and hysteroscope introduced with findings above. all instruments removed from patients vagina, good hemostasis noted.

## 2024-06-25 ENCOUNTER — TELEPHONE (OUTPATIENT)
Facility: CLINIC | Age: 29
End: 2024-06-25

## 2024-06-26 NOTE — TELEPHONE ENCOUNTER
Patient seen/aware of reminding to have lupus anticoagulant lab drawn on or after 6/28/24. Patient verbalized understanding, agreed to and intend to comply with plan of care.

## 2024-07-10 ENCOUNTER — OFFICE VISIT (OUTPATIENT)
Facility: CLINIC | Age: 29
End: 2024-07-10
Payer: COMMERCIAL

## 2024-07-10 ENCOUNTER — LAB ENCOUNTER (OUTPATIENT)
Dept: LAB | Facility: HOSPITAL | Age: 29
End: 2024-07-10
Attending: OBSTETRICS & GYNECOLOGY
Payer: COMMERCIAL

## 2024-07-10 VITALS
HEART RATE: 88 BPM | DIASTOLIC BLOOD PRESSURE: 70 MMHG | HEIGHT: 64 IN | SYSTOLIC BLOOD PRESSURE: 102 MMHG | WEIGHT: 236 LBS | BODY MASS INDEX: 40.29 KG/M2

## 2024-07-10 DIAGNOSIS — Z30.09 BIRTH CONTROL COUNSELING: Primary | ICD-10-CM

## 2024-07-10 DIAGNOSIS — R76.0 LUPUS ANTICOAGULANT POSITIVE: ICD-10-CM

## 2024-07-10 PROCEDURE — 85705 THROMBOPLASTIN INHIBITION: CPT

## 2024-07-10 PROCEDURE — 36415 COLL VENOUS BLD VENIPUNCTURE: CPT

## 2024-07-10 PROCEDURE — 99213 OFFICE O/P EST LOW 20 MIN: CPT | Performed by: OBSTETRICS & GYNECOLOGY

## 2024-07-10 PROCEDURE — 3008F BODY MASS INDEX DOCD: CPT | Performed by: OBSTETRICS & GYNECOLOGY

## 2024-07-10 PROCEDURE — 3078F DIAST BP <80 MM HG: CPT | Performed by: OBSTETRICS & GYNECOLOGY

## 2024-07-10 PROCEDURE — 85613 RUSSELL VIPER VENOM DILUTED: CPT

## 2024-07-10 PROCEDURE — 3074F SYST BP LT 130 MM HG: CPT | Performed by: OBSTETRICS & GYNECOLOGY

## 2024-07-10 PROCEDURE — 85610 PROTHROMBIN TIME: CPT

## 2024-07-10 PROCEDURE — 85732 THROMBOPLASTIN TIME PARTIAL: CPT

## 2024-07-10 RX ORDER — NORELGESTROMIN AND ETHINYL ESTRADIOL 35; 150 UG/MG; UG/MG
1 PATCH TRANSDERMAL WEEKLY
Qty: 3 PATCH | Refills: 11 | Status: SHIPPED | OUTPATIENT
Start: 2024-07-10

## 2024-07-10 NOTE — PROGRESS NOTES
Maral Young is a 28 year old female  Patient's last menstrual period was 2024 (exact date).   Chief Complaint   Patient presents with    Post-Op     HYSTEROSCOPY done on 2024  -No complaints   .  Patient has no complaints, would like to start birth control to mentally recover from another pregnancy loss    OBSTETRICS HISTORY:  OB History    Para Term  AB Living   4 2   2 2     SAB IAB Ectopic Multiple Live Births   1     0        # Outcome Date GA Lbr Wilbert/2nd Weight Sex Type Anes PTL Lv   4 AB 24 15w4d  2.1 oz (0.058 kg) F NORMAL SPONT None Y [place]      Complications: Abruptio Placenta, Other - see comments   3  22 19w0d    Vag-Forceps   FD      Complications: Intraamniotic Infection, Other - see comments   2 SAB  6w0d          1  14 26w0d   M Vag-Spont   FD      Complications: Other - see comments      Obstetric Comments    - Fell onto hard pavement at 24 weeks. Vaginal bleeding 5 hours later. Diagnosed with dilated cervix and placental abruption.   death at 5 days of age.        - miscarriage at 6 -7 wk         - [place]. Moved back to [facility] for the pregnancy. Cerclage placed at 16 wk on 22 for short cervix. Was told she had a previa. Did NOT have vaginal bleeding prior to the short cervix diagnosis. 22 [place] war on [facility] started. 3/5/22 Traveled to [place] and then to [place]. 3/19/22 - Premature rupture membranes & chorioamnionitis diagnosed.       24 - girl \"Jessenia\" Morbid obesity, irregular menses, hirsutism, poor OB history. 3/19/24 Antiphospholipid Ab panel NEGATIVE. Vaginal bleeding from 5 weeks and through the entire pregnancy. Spotting at 5 wk. Plum sized clot at 7 wk. Increased bleeding at 13 wk with closed cervix. Followed by MFM. She returned for a cervical length measurement at 15w3d and was noted to have advanced cervical dilatation (3 cm) with a bulging bag of water.  In addition  anhydramnios is present.  It is unclear whether the patient might of had ruptured membranes or whether she has had a chronic placental abruption with subsequent decreased amniotic fluid volume. Patient then delivered fetus vaginally 24 at home. Underwent immediate ultrasound guided suction D&C for retained placenta. Excessive bleeding after delivery of placenta. Shahnaz device placed. Misoprostol 600 mcg buccally (just prior to procedure), IV oxytocin, IM methergine. INR 1.5. Transfused 2 units PRBC & 1 unit FFP while in recovery. During D&C she apparently aspirated. IV antibiotics.       Gross exam of fetus normal. Karyotype on POC normal female. Placenta: acute chorioamnionitis, acute subchorionitis, focal villous agglutination. Anaerobic culture: 1 colony Bacteroides ovatus (considered normal shannon of the colon & female genital tract). AQUILINO neg. Anti-dsDNA neg. KB neg. TSH & A1c normal. T.pal neg. HIV neg. CMV neg. Parvo neg. GC/CT neg. 24 Lupus anticoagulant POSITIVE. Beta 2 glycoprotein 1 Ab negative. Cardiolipin Ab negative.  Needs repeat testing in 12 wk.        GYNE HISTORY:  Periods regular monthly    History   Sexual Activity    Sexual activity: Not Currently                 MEDICAL HISTORY:  Past Medical History:    Aspiration into airway    aspirated stomach contents/secretions into airway under MAC/sedation for suction D&C. IV Unasyn & IV azithromycin per hospitalist. Discharged on PO Augmentin x 5 days    Hirsutism    History of blood transfusion    incomplete SAB at 15w4d. INR 1.5.Suction D&C for retained placenta, Shahnaz device placed. Coagulopathy. 2 units PRBC, 1 unit PRBC    Incomplete spontaneous  (HCC)    Chronic placental abruption - premature dilation to 4 cm at 15w3d -> spontaneously delivered fetus at 15w4d on 2024. Retained placenta.     Irregular menses    Language barrier    Chinese  needed    Morbid obesity with BMI of 40.0-44.9, adult (HCC)    Recurrent  pregnancy loss    3/19/24 Antiphospholipid Ab panel NEGATIVE. 4/5/24 Lupus anticoagulant POSITIVE. Beta 2 glycoprotein 1 Ab negative. Cardiolipin Ab negative.  Needs repeat testing in 12 wk.       SURGICAL HISTORY:  Past Surgical History:   Procedure Laterality Date    Appendectomy  2004    D & c  04/05/2024    Ultrasound guided suction D&C to evacuate placenta after incomplete SAB at 15w4d. Shahnaz device placed & blood products given for coagulopathy. Dr. Ellyn Tinoco, [facility]    Hysteroscopy,diagnostic  06/24/2024    Normal uterine cavity. Done for recurrent pregnancy loss. Dr. Carolee Hodge, [facility].       SOCIAL HISTORY:  Social History     Socioeconomic History    Marital status:      Spouse name: Not on file    Number of children: Not on file    Years of education: Not on file    Highest education level: Not on file   Occupational History    Not on file   Tobacco Use    Smoking status: Never    Smokeless tobacco: Never   Vaping Use    Vaping status: Never Used   Substance and Sexual Activity    Alcohol use: Not Currently    Drug use: Never    Sexual activity: Not Currently   Other Topics Concern    Not on file   Social History Narrative    Not on file     Social Determinants of Health     Financial Resource Strain: Low Risk  (4/5/2024)    Financial Resource Strain     Difficulty of Paying Living Expenses: Not hard at all     Med Affordability: No   Food Insecurity: No Food Insecurity (4/5/2024)    Food Insecurity     Food Insecurity: Never true   Transportation Needs: No Transportation Needs (4/5/2024)    Transportation Needs     Lack of Transportation: No   Stress: No Stress Concern Present (4/5/2024)    Stress     Feeling of Stress : No   Housing Stability: Low Risk  (4/5/2024)    Housing Stability     Housing Instability: No     Housing Instability Emergency: Not on file     Crib or Bassinette: Not on file       FAMILY HISTORY:  No family history on file.    MEDICATIONS:    Current  Outpatient Medications:     Norelgestromin-Eth Estradiol (XULANE) 150-35 MCG/24HR Transdermal Patch Weekly, Place 1 patch onto the skin once a week. For 3 weeks, then 1 week off, Disp: 3 patch, Rfl: 11    ALLERGIES:  No Known Allergies      PHYSICAL EXAM:   Pelvic Exam:  External Genitalia: normal appearance, hair distribution, and no lesions  Urethral Meatus:  normal in size, location, without lesions and prolapse  Bladder:  No fullness, masses or tenderness  Vagina:  Normal appearance without lesions, no abnormal discharge  Cervix:  Normal without tenderness on motion  Uterus: normal in size, contour, position, mobility, without tenderness  Adnexa: normal without masses or tenderness  Perineum: normal  Anus: no hemorroids       Discussed OCP vs patch vs IUD    Discussed normal assessment of uterine cavity, patient would like to know the management of her next pregnancy, had lupus anticoagulant repeated to day - pending  If all tests are normal will consider lovenox and cerclage     Assessment & Plan:  1. Birth control counseling  - xulane rx sent

## 2024-07-16 LAB
APTT PPP: 29.9 SECONDS (ref 23–36)
INR BLD: 1.06 (ref 0.85–1.16)
LA 3 SCREEN W REFLEX-IMP: NEGATIVE
PROTHROMBIN TIME: 13.8 SECONDS (ref 11.6–14.8)
SCREEN DRVVT: 1.12 S (ref 0–1.29)
SCREEN DRVVT: NEGATIVE S
STACLOT LA DELTA: 4.9 SECONDS (ref ?–8)

## 2024-07-22 RX ORDER — NORELGESTROMIN AND ETHINYL ESTRADIOL 35; 150 UG/MG; UG/MG
1 PATCH TRANSDERMAL WEEKLY
Qty: 3 PATCH | Refills: 11 | OUTPATIENT
Start: 2024-07-22

## 2024-09-24 ENCOUNTER — OFFICE VISIT (OUTPATIENT)
Facility: CLINIC | Age: 29
End: 2024-09-24
Payer: COMMERCIAL

## 2024-09-24 VITALS
HEART RATE: 78 BPM | DIASTOLIC BLOOD PRESSURE: 76 MMHG | HEIGHT: 64.17 IN | WEIGHT: 238.81 LBS | BODY MASS INDEX: 40.77 KG/M2 | SYSTOLIC BLOOD PRESSURE: 132 MMHG

## 2024-09-24 DIAGNOSIS — R30.0 DYSURIA: Primary | ICD-10-CM

## 2024-09-24 PROCEDURE — 3078F DIAST BP <80 MM HG: CPT | Performed by: OBSTETRICS & GYNECOLOGY

## 2024-09-24 PROCEDURE — 99213 OFFICE O/P EST LOW 20 MIN: CPT | Performed by: OBSTETRICS & GYNECOLOGY

## 2024-09-24 PROCEDURE — 3075F SYST BP GE 130 - 139MM HG: CPT | Performed by: OBSTETRICS & GYNECOLOGY

## 2024-09-24 PROCEDURE — 3008F BODY MASS INDEX DOCD: CPT | Performed by: OBSTETRICS & GYNECOLOGY

## 2024-09-24 PROCEDURE — 87086 URINE CULTURE/COLONY COUNT: CPT | Performed by: OBSTETRICS & GYNECOLOGY

## 2024-09-24 RX ORDER — CHOLECALCIFEROL (VITAMIN D3) 25 MCG
1 TABLET,CHEWABLE ORAL DAILY
COMMUNITY

## 2025-05-16 RX ORDER — NORELGESTROMIN AND ETHINYL ESTRADIOL 35; 150 UG/MG; UG/MG
PATCH TRANSDERMAL
Qty: 3 PATCH | Refills: 0 | OUTPATIENT
Start: 2025-05-16

## 2025-06-09 ENCOUNTER — CLINICAL DOCUMENTATION (OUTPATIENT)
Dept: MATERNAL FETAL MEDICINE | Age: 30
End: 2025-06-09

## 2025-06-09 ENCOUNTER — APPOINTMENT (OUTPATIENT)
Dept: OBGYN | Age: 30
End: 2025-06-09

## 2025-06-09 DIAGNOSIS — E66.812 CLASS 2 OBESITY DUE TO EXCESS CALORIES WITHOUT SERIOUS COMORBIDITY WITH BODY MASS INDEX (BMI) OF 39.0 TO 39.9 IN ADULT: ICD-10-CM

## 2025-06-09 DIAGNOSIS — E66.09 CLASS 2 OBESITY DUE TO EXCESS CALORIES WITHOUT SERIOUS COMORBIDITY WITH BODY MASS INDEX (BMI) OF 39.0 TO 39.9 IN ADULT: ICD-10-CM

## 2025-06-09 DIAGNOSIS — N96 RECURRENT PREGNANCY LOSS WITHOUT CURRENT PREGNANCY: Primary | ICD-10-CM

## 2025-06-09 DIAGNOSIS — R76.0 LUPUS ANTICOAGULANT POSITIVE: ICD-10-CM

## 2025-06-09 DIAGNOSIS — N88.3 CERVICAL INCOMPETENCE: ICD-10-CM

## 2025-06-09 RX ORDER — NORELGESTROMIN AND ETHINYL ESTRADIOL 35; 150 UG/MG; UG/MG
PATCH TRANSDERMAL
COMMUNITY
Start: 2025-04-19

## 2025-06-09 ASSESSMENT — PATIENT HEALTH QUESTIONNAIRE - PHQ9
SUM OF ALL RESPONSES TO PHQ9 QUESTIONS 1 AND 2: 0
2. FEELING DOWN, DEPRESSED OR HOPELESS: NOT AT ALL
CLINICAL INTERPRETATION OF PHQ2 SCORE: NO FURTHER SCREENING NEEDED
1. LITTLE INTEREST OR PLEASURE IN DOING THINGS: NOT AT ALL
SUM OF ALL RESPONSES TO PHQ9 QUESTIONS 1 AND 2: 0

## 2025-07-23 ENCOUNTER — TELEPHONE (OUTPATIENT)
Dept: MATERNAL FETAL MEDICINE | Age: 30
End: 2025-07-23

## 2025-08-25 ENCOUNTER — TELEPHONE (OUTPATIENT)
Dept: MATERNAL FETAL MEDICINE | Age: 30
End: 2025-08-25

## (undated) DEVICE — GLOVE SUR 6.5 SENSICARE PI PIP CRM PWD F

## (undated) DEVICE — KIT DEVICE VACUUM-INDUCED HEMORRHAGE CONTROL SYSTEM 60ML SYRINGE 0.25INX12FT

## (undated) DEVICE — PACK GYNE CUSTOM

## (undated) DEVICE — PREMIUM WET SKIN PREP TRAY: Brand: MEDLINE INDUSTRIES, INC.

## (undated) DEVICE — SOLUTION IRRIG 3000ML 0.9% NACL FLX CONT

## (undated) DEVICE — SOLUTION IRRIG 1000ML 0.9% NACL USP BTL

## (undated) DEVICE — SET CYSTO IRRIG L77IN DIA0.241IN BLDR NVENT

## (undated) DEVICE — SLEEVE COMPR MD KNEE LEN SGL USE KENDALL SCD